# Patient Record
Sex: FEMALE | Race: WHITE | NOT HISPANIC OR LATINO
[De-identification: names, ages, dates, MRNs, and addresses within clinical notes are randomized per-mention and may not be internally consistent; named-entity substitution may affect disease eponyms.]

---

## 2017-03-24 ENCOUNTER — APPOINTMENT (OUTPATIENT)
Dept: CARDIOLOGY | Facility: CLINIC | Age: 77
End: 2017-03-24

## 2017-05-16 ENCOUNTER — APPOINTMENT (OUTPATIENT)
Dept: CARDIOLOGY | Facility: CLINIC | Age: 77
End: 2017-05-16

## 2017-05-25 ENCOUNTER — APPOINTMENT (OUTPATIENT)
Dept: CARDIOLOGY | Facility: CLINIC | Age: 77
End: 2017-05-25

## 2017-05-30 ENCOUNTER — APPOINTMENT (OUTPATIENT)
Dept: CARDIOLOGY | Facility: CLINIC | Age: 77
End: 2017-05-30

## 2017-05-31 ENCOUNTER — APPOINTMENT (OUTPATIENT)
Dept: CARDIOLOGY | Facility: CLINIC | Age: 77
End: 2017-05-31

## 2017-06-06 ENCOUNTER — APPOINTMENT (OUTPATIENT)
Dept: CARDIOLOGY | Facility: CLINIC | Age: 77
End: 2017-06-06

## 2017-06-09 ENCOUNTER — APPOINTMENT (OUTPATIENT)
Dept: CARDIOLOGY | Facility: CLINIC | Age: 77
End: 2017-06-09

## 2018-02-27 ENCOUNTER — RECORD ABSTRACTING (OUTPATIENT)
Age: 78
End: 2018-02-27

## 2018-02-27 DIAGNOSIS — Z87.39 PERSONAL HISTORY OF OTHER DISEASES OF THE MUSCULOSKELETAL SYSTEM AND CONNECTIVE TISSUE: ICD-10-CM

## 2018-02-27 DIAGNOSIS — Z80.9 FAMILY HISTORY OF MALIGNANT NEOPLASM, UNSPECIFIED: ICD-10-CM

## 2018-02-27 DIAGNOSIS — Z86.39 PERSONAL HISTORY OF OTHER ENDOCRINE, NUTRITIONAL AND METABOLIC DISEASE: ICD-10-CM

## 2018-02-27 DIAGNOSIS — Z86.79 PERSONAL HISTORY OF OTHER DISEASES OF THE CIRCULATORY SYSTEM: ICD-10-CM

## 2018-02-27 DIAGNOSIS — Z86.59 PERSONAL HISTORY OF OTHER MENTAL AND BEHAVIORAL DISORDERS: ICD-10-CM

## 2018-02-27 DIAGNOSIS — Z98.890 OTHER SPECIFIED POSTPROCEDURAL STATES: ICD-10-CM

## 2018-02-27 DIAGNOSIS — H54.40 BLINDNESS, ONE EYE, UNSPECIFIED EYE: ICD-10-CM

## 2018-02-27 DIAGNOSIS — K76.0 FATTY (CHANGE OF) LIVER, NOT ELSEWHERE CLASSIFIED: ICD-10-CM

## 2018-02-27 DIAGNOSIS — F17.200 NICOTINE DEPENDENCE, UNSPECIFIED, UNCOMPLICATED: ICD-10-CM

## 2018-02-28 ENCOUNTER — APPOINTMENT (OUTPATIENT)
Dept: CARDIOLOGY | Facility: CLINIC | Age: 78
End: 2018-02-28

## 2018-03-21 ENCOUNTER — APPOINTMENT (OUTPATIENT)
Dept: CARDIOLOGY | Facility: CLINIC | Age: 78
End: 2018-03-21
Payer: MEDICARE

## 2018-03-21 VITALS
HEART RATE: 90 BPM | SYSTOLIC BLOOD PRESSURE: 130 MMHG | BODY MASS INDEX: 37.3 KG/M2 | DIASTOLIC BLOOD PRESSURE: 60 MMHG | HEIGHT: 60 IN | WEIGHT: 190 LBS

## 2018-03-21 PROCEDURE — 93280 PM DEVICE PROGR EVAL DUAL: CPT

## 2018-05-29 ENCOUNTER — RESULT REVIEW (OUTPATIENT)
Age: 78
End: 2018-05-29

## 2018-06-08 ENCOUNTER — APPOINTMENT (OUTPATIENT)
Dept: CARDIOLOGY | Facility: CLINIC | Age: 78
End: 2018-06-08
Payer: MEDICARE

## 2018-06-08 VITALS
HEART RATE: 82 BPM | DIASTOLIC BLOOD PRESSURE: 68 MMHG | BODY MASS INDEX: 35.51 KG/M2 | WEIGHT: 193 LBS | SYSTOLIC BLOOD PRESSURE: 120 MMHG | HEIGHT: 62 IN

## 2018-06-08 PROCEDURE — 99214 OFFICE O/P EST MOD 30 MIN: CPT

## 2018-06-08 PROCEDURE — 93280 PM DEVICE PROGR EVAL DUAL: CPT

## 2018-07-09 ENCOUNTER — MEDICATION RENEWAL (OUTPATIENT)
Age: 78
End: 2018-07-09

## 2018-07-27 ENCOUNTER — APPOINTMENT (OUTPATIENT)
Dept: CARDIOLOGY | Facility: CLINIC | Age: 78
End: 2018-07-27

## 2018-09-14 ENCOUNTER — APPOINTMENT (OUTPATIENT)
Dept: CARDIOLOGY | Facility: CLINIC | Age: 78
End: 2018-09-14

## 2018-09-21 ENCOUNTER — APPOINTMENT (OUTPATIENT)
Dept: CARDIOLOGY | Facility: CLINIC | Age: 78
End: 2018-09-21

## 2018-10-19 ENCOUNTER — APPOINTMENT (OUTPATIENT)
Dept: CARDIOLOGY | Facility: CLINIC | Age: 78
End: 2018-10-19
Payer: MEDICARE

## 2018-10-19 VITALS
HEIGHT: 62 IN | HEART RATE: 70 BPM | BODY MASS INDEX: 35.15 KG/M2 | WEIGHT: 191 LBS | SYSTOLIC BLOOD PRESSURE: 136 MMHG | DIASTOLIC BLOOD PRESSURE: 76 MMHG

## 2018-10-19 PROCEDURE — 93000 ELECTROCARDIOGRAM COMPLETE: CPT | Mod: 59

## 2018-10-19 PROCEDURE — 99213 OFFICE O/P EST LOW 20 MIN: CPT

## 2018-10-19 PROCEDURE — 93280 PM DEVICE PROGR EVAL DUAL: CPT

## 2018-10-19 PROCEDURE — 93306 TTE W/DOPPLER COMPLETE: CPT

## 2018-10-24 ENCOUNTER — APPOINTMENT (OUTPATIENT)
Dept: CARDIOLOGY | Facility: CLINIC | Age: 78
End: 2018-10-24

## 2018-10-24 DIAGNOSIS — F10.21 ALCOHOL DEPENDENCE, IN REMISSION: ICD-10-CM

## 2018-10-24 DIAGNOSIS — Z82.49 FAMILY HISTORY OF ISCHEMIC HEART DISEASE AND OTHER DISEASES OF THE CIRCULATORY SYSTEM: ICD-10-CM

## 2018-11-02 ENCOUNTER — APPOINTMENT (OUTPATIENT)
Dept: CARDIOLOGY | Facility: CLINIC | Age: 78
End: 2018-11-02
Payer: MEDICARE

## 2018-11-02 PROCEDURE — A9502: CPT

## 2018-11-02 PROCEDURE — 93015 CV STRESS TEST SUPVJ I&R: CPT

## 2018-11-02 PROCEDURE — 78452 HT MUSCLE IMAGE SPECT MULT: CPT

## 2018-11-09 ENCOUNTER — APPOINTMENT (OUTPATIENT)
Dept: CARDIOLOGY | Facility: CLINIC | Age: 78
End: 2018-11-09

## 2018-12-20 ENCOUNTER — APPOINTMENT (OUTPATIENT)
Dept: CARDIOLOGY | Facility: CLINIC | Age: 78
End: 2018-12-20

## 2019-01-10 ENCOUNTER — APPOINTMENT (OUTPATIENT)
Dept: CARDIOLOGY | Facility: CLINIC | Age: 79
End: 2019-01-10

## 2019-04-04 ENCOUNTER — APPOINTMENT (OUTPATIENT)
Dept: CARDIOLOGY | Facility: CLINIC | Age: 79
End: 2019-04-04
Payer: MEDICARE

## 2019-04-04 VITALS
HEART RATE: 88 BPM | OXYGEN SATURATION: 98 % | SYSTOLIC BLOOD PRESSURE: 140 MMHG | DIASTOLIC BLOOD PRESSURE: 80 MMHG | BODY MASS INDEX: 33.13 KG/M2 | HEIGHT: 62 IN | WEIGHT: 180 LBS

## 2019-04-04 PROCEDURE — 99214 OFFICE O/P EST MOD 30 MIN: CPT

## 2019-04-17 ENCOUNTER — APPOINTMENT (OUTPATIENT)
Dept: CARDIOLOGY | Facility: CLINIC | Age: 79
End: 2019-04-17

## 2019-05-30 ENCOUNTER — CLINICAL ADVICE (OUTPATIENT)
Age: 79
End: 2019-05-30

## 2019-06-13 ENCOUNTER — APPOINTMENT (OUTPATIENT)
Dept: CARDIOLOGY | Facility: CLINIC | Age: 79
End: 2019-06-13

## 2019-06-20 ENCOUNTER — APPOINTMENT (OUTPATIENT)
Dept: CARDIOLOGY | Facility: CLINIC | Age: 79
End: 2019-06-20

## 2019-08-23 ENCOUNTER — APPOINTMENT (OUTPATIENT)
Dept: CARDIOLOGY | Facility: CLINIC | Age: 79
End: 2019-08-23

## 2019-08-27 ENCOUNTER — APPOINTMENT (OUTPATIENT)
Dept: CARDIOLOGY | Facility: CLINIC | Age: 79
End: 2019-08-27
Payer: MEDICARE

## 2019-08-27 PROCEDURE — 93280 PM DEVICE PROGR EVAL DUAL: CPT

## 2019-08-27 NOTE — PROCEDURE
[NSR] : normal sinus rhythm [Pacemaker] : pacemaker [Voltage: ___ volts] : Voltage was [unfilled] volts [DDDR] : DDDR [Sensing Amplitude ___mv] : sensing amplitude was [unfilled] mv [Lead Imp:  ___ohms] : lead impedance was [unfilled] ohms [___V @] : [unfilled] V [___ ms] : [unfilled] ms [None] : none [de-identified] : Medtronic [de-identified] : Adaptkamilah ADDRS1 [de-identified] : [id number] [de-identified] : 12/15/11 [de-identified] :  [de-identified] : Apaced 43.7%\par Vpaced 0.6%\par \par Settings \par Atrial \par Sensing 0.5mv\par Amplitude 1.5v\par Duration 0.4ms\par \par Ventricle\par Sensing 5.6mv\par Amplitude 2.0v\par Duration 0.4ms\par \par PPM interrogation in 2 months\par Patient requesting office visit with Dr. Roman\par \par Sincerely,\par \par Vani Crum PA-C\par Supervising MD: Dr. Nichole Almeida  [de-identified] : 1-12months

## 2019-08-27 NOTE — HISTORY OF PRESENT ILLNESS
[de-identified] : Patient called the office requesting to be seen for left arm swelling.  She is concerned because she has a permanent pacemaker on the left side which was implanted in 2011.  Recently called the office with similar concerns.  Recently evaluated at [facility] on February 27, 2018 for left upper extremity swelling and arm pain.  Ultrasound of the left upper extremity was performed to rule out DVT.  No evidence of left upper extremity DVT. She ended up leaving the hospital AMA. PPM has not been interrogated in our office recently. She states symptoms of left upper extremity swelling has been going on "for months". There is a history of a slip and fall onto her left arm months ago as well. Today she denies pain to her left arm\par \par Today he denies chest pain, pressure, unusual shortness of breath, lightheadedness, dizziness, near syncope or syncope.\par

## 2019-09-18 ENCOUNTER — APPOINTMENT (OUTPATIENT)
Dept: CARDIOLOGY | Facility: CLINIC | Age: 79
End: 2019-09-18

## 2019-09-18 ENCOUNTER — APPOINTMENT (OUTPATIENT)
Dept: CARDIOLOGY | Facility: CLINIC | Age: 79
End: 2019-09-18
Payer: MEDICARE

## 2019-09-18 PROCEDURE — 93280 PM DEVICE PROGR EVAL DUAL: CPT

## 2019-09-18 RX ORDER — KRILL/OM-3/DHA/EPA/PHOSPHO/AST 1000-230MG
81 CAPSULE ORAL
Refills: 0 | Status: ACTIVE | COMMUNITY

## 2019-09-18 NOTE — HISTORY OF PRESENT ILLNESS
[de-identified] : Patient called the office requesting to be seen for left arm swelling.  She is concerned because she has a permanent pacemaker on the left side which was implanted in 2011.  Recently called the office with similar concerns.  Recently evaluated at [facility] on February 27, 2018 for left upper extremity swelling and arm pain.  Ultrasound of the left upper extremity was performed to rule out DVT.  No evidence of left upper extremity DVT. She ended up leaving the hospital AMA. PPM has not been interrogated in our office recently. She states symptoms of left upper extremity swelling has been going on "for months". There is a history of a slip and fall onto her left arm months ago as well. Today she denies pain to her left arm\par \par Today he denies chest pain, pressure, unusual shortness of breath, lightheadedness, dizziness, near syncope or syncope.\par

## 2019-09-18 NOTE — PROCEDURE
[NSR] : normal sinus rhythm [Pacemaker] : pacemaker [Voltage: ___ volts] : Voltage was [unfilled] volts [DDDR] : DDDR [Lead Imp:  ___ohms] : lead impedance was [unfilled] ohms [Sensing Amplitude ___mv] : sensing amplitude was [unfilled] mv [___ ms] : [unfilled] ms [___V @] : [unfilled] V [None] : none [de-identified] : Adaptkamilah ADDRS1 [de-identified] : Medtronic [de-identified] : [id number] [de-identified] : 12/15/11 [de-identified] : 1-13months [de-identified] : Apaced 10.3%\par Vpaced 0.4%\par \par Settings \par Atrial \par Sensing 0.5mv\par Amplitude 1.5v\par Duration 0.4ms\par \par Ventricle\par Sensing 5.6mv\par Amplitude 2.0v\par Duration 0.4ms\par \par \par Patient called the office requesting to have PPM interrogated. \par No new symptoms at todays visit. \par \par PPM interrogation in 2 months\par \par \par Sincerely,\par \par Vani Crum PA-C\par Supervising MD: Dr. Tavera [de-identified] :

## 2019-09-26 ENCOUNTER — APPOINTMENT (OUTPATIENT)
Dept: CARDIOLOGY | Facility: CLINIC | Age: 79
End: 2019-09-26

## 2019-11-04 ENCOUNTER — APPOINTMENT (OUTPATIENT)
Dept: CARDIOLOGY | Facility: CLINIC | Age: 79
End: 2019-11-04
Payer: MEDICARE

## 2019-11-04 ENCOUNTER — NON-APPOINTMENT (OUTPATIENT)
Age: 79
End: 2019-11-04

## 2019-11-04 VITALS
BODY MASS INDEX: 29.44 KG/M2 | WEIGHT: 160 LBS | HEART RATE: 84 BPM | SYSTOLIC BLOOD PRESSURE: 142 MMHG | HEIGHT: 62 IN | DIASTOLIC BLOOD PRESSURE: 70 MMHG

## 2019-11-04 PROCEDURE — 93000 ELECTROCARDIOGRAM COMPLETE: CPT | Mod: 59

## 2019-11-04 PROCEDURE — 93280 PM DEVICE PROGR EVAL DUAL: CPT

## 2019-11-04 PROCEDURE — 99214 OFFICE O/P EST MOD 30 MIN: CPT | Mod: 25

## 2019-11-04 NOTE — PHYSICAL EXAM
[General Appearance - Well Developed] : well developed [Normal Appearance] : normal appearance [Well Groomed] : well groomed [General Appearance - Well Nourished] : well nourished [No Deformities] : no deformities [General Appearance - In No Acute Distress] : no acute distress [Normal Conjunctiva] : the conjunctiva exhibited no abnormalities [Eyelids - No Xanthelasma] : the eyelids demonstrated no xanthelasmas [Normal Oral Mucosa] : normal oral mucosa [No Oral Pallor] : no oral pallor [No Oral Cyanosis] : no oral cyanosis [Respiration, Rhythm And Depth] : normal respiratory rhythm and effort [Exaggerated Use Of Accessory Muscles For Inspiration] : no accessory muscle use [Auscultation Breath Sounds / Voice Sounds] : lungs were clear to auscultation bilaterally [Heart Rate And Rhythm] : heart rate and rhythm were normal [Heart Sounds] : normal S1 and S2 [Murmurs] : no murmurs present [Abdomen Mass (___ Cm)] : no abdominal mass palpated [Nail Clubbing] : no clubbing of the fingernails [Cyanosis, Localized] : no localized cyanosis [Petechial Hemorrhages (___cm)] : no petechial hemorrhages [Skin Color & Pigmentation] : normal skin color and pigmentation [Skin Turgor] : normal skin turgor [] : no rash [No Anxiety] : not feeling anxious [FreeTextEntry1] : flat affect, forgetful.

## 2019-11-04 NOTE — REVIEW OF SYSTEMS
[Feeling Fatigued] : feeling fatigued [Shortness Of Breath] : shortness of breath [Dyspnea on exertion] : dyspnea during exertion [Chest Pain] : chest pain [Lower Ext Edema] : no extremity edema [Joint Pain] : joint pain [see HPI] : see HPI [Memory Lapses Or Loss] : memory lapses or loss [Negative] : Heme/Lymph

## 2019-11-04 NOTE — REASON FOR VISIT
[Follow-Up - Clinic] : a clinic follow-up of [Chest Pain] : chest pain [Hypertension] : hypertension [Medication Management] : Medication management [Pacemaker Evaluation] : pacemaker ~T evaluation ~C was performed

## 2019-11-04 NOTE — PROCEDURE
[NSR] : normal sinus rhythm [Pacemaker] : pacemaker [DDDR] : DDDR [Voltage: ___ volts] : Voltage was [unfilled] volts [Lead Imp:  ___ohms] : lead impedance was [unfilled] ohms [Sensing Amplitude ___mv] : sensing amplitude was [unfilled] mv [___V @] : [unfilled] V [___ ms] : [unfilled] ms [None] : none [de-identified] : Medtronic [de-identified] : Adaptkamilah ADDRS1 [de-identified] : [id number] [de-identified] : 12/15/11 [de-identified] :  [de-identified] : 1-9months [de-identified] : Apaced 22.2%\par Vpaced 1%\par \par Settings \par Atrial \par Sensing 0.5mv\par Amplitude 1.5v\par Duration 0.4ms\par \par Ventricle\par Sensing 5.6mv\par Amplitude 2.0v\par Duration 0.4ms\par \par PPM interrogation in 2 months\par \par \par Sincerely,\par \par Vani Crum PA-C\par Supervising MD: Dr. Roman

## 2019-11-04 NOTE — HISTORY OF PRESENT ILLNESS
[de-identified] : Patient called the office requesting to be seen for left arm swelling.  She is concerned because she has a permanent pacemaker on the left side which was implanted in 2011.  Recently called the office with similar concerns.  Recently evaluated at [facility] on February 27, 2018 for left upper extremity swelling and arm pain.  Ultrasound of the left upper extremity was performed to rule out DVT.  No evidence of left upper extremity DVT. She ended up leaving the hospital AMA. PPM has not been interrogated in our office recently. She states symptoms of left upper extremity swelling has been going on "for months". There is a history of a slip and fall onto her left arm months ago as well. Today she denies pain to her left arm\par \par Today he denies chest pain, pressure, unusual shortness of breath, lightheadedness, dizziness, near syncope or syncope.\par

## 2019-11-04 NOTE — DISCUSSION/SUMMARY
[FreeTextEntry1] : 79F w/ PMH as above presenting for routine evaluation.  Her stress test showed no signs of ischemia.  She continues to smoke and I have counselled her extensively on cessation. \par \par 1. Continue beta blockade - BP adequately controlled\par 3. Strict DM control, smoking cessation\par 3. Psych follow up\par 4. Device follow up\par \par RTC 6 months

## 2019-11-04 NOTE — HISTORY OF PRESENT ILLNESS
[FreeTextEntry1] : 79F w/ PMH of HTN, DM, HLD, active smoker, PPM presenting for follow up in cardiology. Patient has poor exercise tolerance and is only able to walk 1 block at most without stopping.  She lives in a ranch style home and is unable to climb stairs. 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 She still smokes 1ppd. \par \par She had a fall over the summer. She states she was evaluated at [facility]. No fractures. Continues to be fatigued and with memory difficulty. \par

## 2020-03-11 ENCOUNTER — APPOINTMENT (OUTPATIENT)
Dept: CARDIOLOGY | Facility: CLINIC | Age: 80
End: 2020-03-11
Payer: MEDICARE

## 2020-03-11 VITALS
WEIGHT: 160 LBS | HEART RATE: 86 BPM | DIASTOLIC BLOOD PRESSURE: 78 MMHG | BODY MASS INDEX: 31.41 KG/M2 | HEIGHT: 60 IN | SYSTOLIC BLOOD PRESSURE: 132 MMHG | OXYGEN SATURATION: 100 %

## 2020-03-11 PROCEDURE — 93279 PRGRMG DEV EVAL PM/LDLS PM: CPT

## 2020-03-11 PROCEDURE — 99215 OFFICE O/P EST HI 40 MIN: CPT

## 2020-03-11 PROCEDURE — 93280 PM DEVICE PROGR EVAL DUAL: CPT

## 2020-03-11 PROCEDURE — 93306 TTE W/DOPPLER COMPLETE: CPT

## 2020-03-11 NOTE — REVIEW OF SYSTEMS
[Feeling Fatigued] : feeling fatigued [Shortness Of Breath] : shortness of breath [Dyspnea on exertion] : dyspnea during exertion [Chest Pain] : chest pain [Joint Pain] : joint pain [see HPI] : see HPI [Memory Lapses Or Loss] : memory lapses or loss [Negative] : Heme/Lymph [Lower Ext Edema] : no extremity edema

## 2020-03-11 NOTE — DISCUSSION/SUMMARY
[FreeTextEntry1] : 79F w/ PMH as above presenting for routine evaluation.  Her stress test showed no signs of ischemia.  She continues to smoke and I have counselled her extensively on cessation. \par \par 1. Hypertension: Continue beta blockade (high risk medication with no signs of toxicity) - BP adequately controlled\par 3. Strict DM control, smoking cessation\par 3. Psych follow up\par 4. History of Bradycardia: PPM approaching DARRON. Update echo and EP evaluation for generator change.\par \par RTC 6 months

## 2020-03-11 NOTE — PROCEDURE
[NSR] : normal sinus rhythm [Pacemaker] : pacemaker [VVI] : VVI [Voltage: ___ volts] : Voltage was [unfilled] volts [None] : none [de-identified] : Medtronic [de-identified] : Adaptkamilah ADDRS1 [de-identified] : [id number] [de-identified] : 12/15/11 [de-identified] :  [de-identified] : 67 [de-identified] : DARRON triggered on 2/19/2020 [de-identified] : Sensed: 99.1%\par Vpaced 0.9%\par \par Settings \par Atrial \par Sensing 0.5mv\par Amplitude 1.5v\par Duration 0.4ms\par \par Ventricle\par Sensing 5.6mv\par Amplitude 2.0v\par Duration 0.4ms\par \par Device at DARRON. \par Seeing Dr. Roman and Tamie Puckett today.

## 2020-03-11 NOTE — HISTORY OF PRESENT ILLNESS
[FreeTextEntry1] : 79F w/ PMH of HTN, DM, HLD, active smoker, PPM presenting for follow up in cardiology. Patient has poor exercise tolerance and is only able to walk 1 block at most without stopping.  She lives in a ranch style home and is unable to climb stairs. 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 She still smokes 1ppd. \par \par She had a fall over the summer. She states she was evaluated at [facility]. No fractures. Continues to be fatigued and with memory difficulty. \par \par Still complaining of left arm swelling.\par

## 2020-04-06 ENCOUNTER — OUTPATIENT (OUTPATIENT)
Dept: OUTPATIENT SERVICES | Facility: HOSPITAL | Age: 80
LOS: 1 days | End: 2020-04-06
Payer: MEDICARE

## 2020-04-06 DIAGNOSIS — Z90.49 ACQUIRED ABSENCE OF OTHER SPECIFIED PARTS OF DIGESTIVE TRACT: Chronic | ICD-10-CM

## 2020-04-06 DIAGNOSIS — E89.0 POSTPROCEDURAL HYPOTHYROIDISM: Chronic | ICD-10-CM

## 2020-04-06 DIAGNOSIS — Z95.0 PRESENCE OF CARDIAC PACEMAKER: Chronic | ICD-10-CM

## 2020-04-06 DIAGNOSIS — Z98.51 TUBAL LIGATION STATUS: Chronic | ICD-10-CM

## 2020-04-06 PROCEDURE — 33228 REMV&REPLC PM GEN DUAL LEAD: CPT

## 2020-04-06 PROCEDURE — 99235 HOSP IP/OBS SAME DATE MOD 70: CPT | Mod: 25

## 2020-04-13 ENCOUNTER — APPOINTMENT (OUTPATIENT)
Dept: CARDIOLOGY | Facility: CLINIC | Age: 80
End: 2020-04-13
Payer: MEDICARE

## 2020-04-13 ENCOUNTER — APPOINTMENT (OUTPATIENT)
Dept: ELECTROPHYSIOLOGY | Facility: CLINIC | Age: 80
End: 2020-04-13
Payer: MEDICARE

## 2020-04-13 VITALS — SYSTOLIC BLOOD PRESSURE: 120 MMHG | OXYGEN SATURATION: 98 % | HEART RATE: 62 BPM | DIASTOLIC BLOOD PRESSURE: 60 MMHG

## 2020-04-13 DIAGNOSIS — Z45.010 ENCOUNTER FOR CHECKING AND TESTING OF CARDIAC PACEMAKER PULSE GENERATOR [BATTERY]: ICD-10-CM

## 2020-04-13 PROCEDURE — 93280 PM DEVICE PROGR EVAL DUAL: CPT

## 2020-04-13 PROCEDURE — 99024 POSTOP FOLLOW-UP VISIT: CPT

## 2020-04-13 RX ORDER — QUETIAPINE 100 MG/1
100 TABLET, FILM COATED ORAL DAILY
Refills: 0 | Status: ACTIVE | COMMUNITY

## 2020-04-13 RX ORDER — MECLIZINE HYDROCHLORIDE 25 MG/1
25 TABLET ORAL
Refills: 0 | Status: DISCONTINUED | COMMUNITY
End: 2020-04-13

## 2020-04-13 RX ORDER — LEVOTHYROXINE SODIUM 0.11 MG/1
112 TABLET ORAL DAILY
Refills: 0 | Status: ACTIVE | COMMUNITY

## 2020-04-13 RX ORDER — INSULIN DETEMIR 100 [IU]/ML
100 INJECTION, SOLUTION SUBCUTANEOUS
Refills: 0 | Status: DISCONTINUED | COMMUNITY
End: 2020-04-13

## 2020-04-13 RX ORDER — DULOXETINE HYDROCHLORIDE 30 MG/1
30 CAPSULE, DELAYED RELEASE PELLETS ORAL
Refills: 0 | Status: ACTIVE | COMMUNITY

## 2020-04-13 RX ORDER — DULOXETINE HYDROCHLORIDE 60 MG/1
60 CAPSULE, DELAYED RELEASE PELLETS ORAL
Refills: 0 | Status: ACTIVE | COMMUNITY

## 2020-04-13 RX ORDER — QUETIAPINE 300 MG/1
300 TABLET, FILM COATED ORAL DAILY
Refills: 0 | Status: DISCONTINUED | COMMUNITY
End: 2020-04-13

## 2020-04-13 RX ORDER — INSULIN GLARGINE 100 [IU]/ML
100 INJECTION, SOLUTION SUBCUTANEOUS
Refills: 0 | Status: DISCONTINUED | COMMUNITY
End: 2020-04-13

## 2020-04-14 ENCOUNTER — APPOINTMENT (OUTPATIENT)
Dept: CARDIOLOGY | Facility: CLINIC | Age: 80
End: 2020-04-14

## 2020-04-30 ENCOUNTER — NON-APPOINTMENT (OUTPATIENT)
Age: 80
End: 2020-04-30

## 2020-12-10 ENCOUNTER — NON-APPOINTMENT (OUTPATIENT)
Age: 80
End: 2020-12-10

## 2020-12-11 ENCOUNTER — EMERGENCY (EMERGENCY)
Facility: HOSPITAL | Age: 80
LOS: 1 days | End: 2020-12-11
Admitting: EMERGENCY MEDICINE
Payer: MEDICARE

## 2020-12-11 ENCOUNTER — APPOINTMENT (OUTPATIENT)
Dept: CARDIOLOGY | Facility: CLINIC | Age: 80
End: 2020-12-11
Payer: MEDICARE

## 2020-12-11 ENCOUNTER — NON-APPOINTMENT (OUTPATIENT)
Age: 80
End: 2020-12-11

## 2020-12-11 VITALS
HEART RATE: 60 BPM | DIASTOLIC BLOOD PRESSURE: 68 MMHG | SYSTOLIC BLOOD PRESSURE: 114 MMHG | OXYGEN SATURATION: 95 % | WEIGHT: 178 LBS | HEIGHT: 60 IN | BODY MASS INDEX: 34.95 KG/M2

## 2020-12-11 DIAGNOSIS — Z90.49 ACQUIRED ABSENCE OF OTHER SPECIFIED PARTS OF DIGESTIVE TRACT: Chronic | ICD-10-CM

## 2020-12-11 DIAGNOSIS — E89.0 POSTPROCEDURAL HYPOTHYROIDISM: Chronic | ICD-10-CM

## 2020-12-11 DIAGNOSIS — Z95.0 PRESENCE OF CARDIAC PACEMAKER: Chronic | ICD-10-CM

## 2020-12-11 DIAGNOSIS — Z98.51 TUBAL LIGATION STATUS: Chronic | ICD-10-CM

## 2020-12-11 PROCEDURE — 99214 OFFICE O/P EST MOD 30 MIN: CPT

## 2020-12-11 PROCEDURE — 93010 ELECTROCARDIOGRAM REPORT: CPT

## 2020-12-11 PROCEDURE — 99285 EMERGENCY DEPT VISIT HI MDM: CPT | Mod: GC

## 2020-12-11 PROCEDURE — 93000 ELECTROCARDIOGRAM COMPLETE: CPT

## 2020-12-11 NOTE — PHYSICAL EXAM
[General Appearance - Well Developed] : well developed [Normal Appearance] : normal appearance [Well Groomed] : well groomed [General Appearance - Well Nourished] : well nourished [No Deformities] : no deformities [General Appearance - In No Acute Distress] : no acute distress [Normal Conjunctiva] : the conjunctiva exhibited no abnormalities [Eyelids - No Xanthelasma] : the eyelids demonstrated no xanthelasmas [Normal Oral Mucosa] : normal oral mucosa [No Oral Pallor] : no oral pallor [No Oral Cyanosis] : no oral cyanosis [Normal Jugular Venous A Waves Present] : normal jugular venous A waves present [Normal Jugular Venous V Waves Present] : normal jugular venous V waves present [No Jugular Venous Wade A Waves] : no jugular venous wade A waves [] : no respiratory distress [Respiration, Rhythm And Depth] : normal respiratory rhythm and effort [Exaggerated Use Of Accessory Muscles For Inspiration] : no accessory muscle use [Auscultation Breath Sounds / Voice Sounds] : lungs were clear to auscultation bilaterally [Heart Rate And Rhythm] : heart rate and rhythm were normal [Heart Sounds] : normal S1 and S2 [Murmurs] : no murmurs present

## 2020-12-11 NOTE — ASSESSMENT
[FreeTextEntry1] : Left arm swelling: The patient is status post permanent pacemaker implant on the left side.  The patient recently noted noted left upper extremity edema.  There is no edema today.  She states it is somewhat intermittent.\par \par Shortness of breath: Today the patient noted exertional shortness of breath after moderate exertion.  This is unusual for her.\par \par Overall I have recommended emergency room evaluation for upper extremity duplex as well as CTA of the pulmonary vasculature.  Reviewed with Dr. Mayo in the ER.\par \par Hypertension: Well-controlled.\par \par Bradycardia: Patient is status post permanent pacemaker implant.

## 2020-12-13 ENCOUNTER — EMERGENCY (EMERGENCY)
Facility: HOSPITAL | Age: 80
LOS: 1 days | End: 2020-12-13
Admitting: EMERGENCY MEDICINE
Payer: MEDICARE

## 2020-12-13 ENCOUNTER — NON-APPOINTMENT (OUTPATIENT)
Age: 80
End: 2020-12-13

## 2020-12-13 DIAGNOSIS — Z95.0 PRESENCE OF CARDIAC PACEMAKER: Chronic | ICD-10-CM

## 2020-12-13 DIAGNOSIS — Z90.49 ACQUIRED ABSENCE OF OTHER SPECIFIED PARTS OF DIGESTIVE TRACT: Chronic | ICD-10-CM

## 2020-12-13 DIAGNOSIS — E89.0 POSTPROCEDURAL HYPOTHYROIDISM: Chronic | ICD-10-CM

## 2020-12-13 DIAGNOSIS — Z98.51 TUBAL LIGATION STATUS: Chronic | ICD-10-CM

## 2020-12-13 PROCEDURE — 93010 ELECTROCARDIOGRAM REPORT: CPT

## 2020-12-13 PROCEDURE — 93971 EXTREMITY STUDY: CPT | Mod: 26,LT

## 2020-12-13 PROCEDURE — 99284 EMERGENCY DEPT VISIT MOD MDM: CPT

## 2020-12-13 PROCEDURE — 71045 X-RAY EXAM CHEST 1 VIEW: CPT | Mod: 26

## 2020-12-14 ENCOUNTER — NON-APPOINTMENT (OUTPATIENT)
Age: 80
End: 2020-12-14

## 2021-02-15 ENCOUNTER — NON-APPOINTMENT (OUTPATIENT)
Age: 81
End: 2021-02-15

## 2021-02-15 ENCOUNTER — RESULT CHARGE (OUTPATIENT)
Age: 81
End: 2021-02-15

## 2021-02-16 ENCOUNTER — APPOINTMENT (OUTPATIENT)
Dept: CARDIOLOGY | Facility: CLINIC | Age: 81
End: 2021-02-16
Payer: MEDICARE

## 2021-02-16 VITALS
HEART RATE: 86 BPM | WEIGHT: 172 LBS | HEIGHT: 60 IN | OXYGEN SATURATION: 99 % | DIASTOLIC BLOOD PRESSURE: 80 MMHG | SYSTOLIC BLOOD PRESSURE: 160 MMHG | BODY MASS INDEX: 33.77 KG/M2

## 2021-02-16 PROCEDURE — 93280 PM DEVICE PROGR EVAL DUAL: CPT

## 2021-02-16 PROCEDURE — 99215 OFFICE O/P EST HI 40 MIN: CPT | Mod: 25

## 2021-02-16 RX ORDER — FUROSEMIDE 20 MG/1
20 TABLET ORAL
Qty: 30 | Refills: 0 | Status: DISCONTINUED | COMMUNITY
End: 2021-02-16

## 2021-02-16 NOTE — HISTORY OF PRESENT ILLNESS
[FreeTextEntry1] : 80F w/ PMH of HTN, DM, HLD, active smoker, PPM s/p gen change April 2020, frequent falls. She has very unstable gait at baseline. She has limited functional status. \par \par She was in [facility] Dec 2020 for LUE swelling. US study negative for DVT. CXR showed NAD. She was discharged in stable condition. \par \par More recently was taken to [facility] (awaiting records), according to patient was 2/9/21 after a fall face forward when getting up from chair at the hair salon. Pt denies any prodromal symptoms. She states at home has had several falls. She may be noncompliant with use of cane/walker. She states was sent home without any change in medications, advised to f/u ENT and cardiology. Periorbital areas appear bruised. She is very unsteady. She seems to have poor hydration as well. This morning only had a "mocha" and she is orthostatic. BP seated 140/70 and standing 110/60, denies dizziness. \par \par Her device interrogation had no events from July until 2/4 and 2/10, 2021. There were 3 episodes of SVT 1-6 mins, 150-180s. This did not correlate with falls/syncope. She is on beta blocker. \par \par LVEF March 2020 was normal. Nuclear stress test Nov 2018 no significant ischemia. Awaiting [place] labs and [place] [facility] \par \par She denies chest pain, unusual sob, orthopnea, edema, palpitations. Denies abnormal bleeding/black stool.

## 2021-02-16 NOTE — PROCEDURE
[No] : not [See Scanned Paceart Report] : See scanned paceart report [Pacemaker] : pacemaker [de-identified] : RORY

## 2021-02-16 NOTE — REASON FOR VISIT
[Follow-Up - From Hospitalization] : follow-up of a recent hospitalization for [Syncope] : syncope [Discharge Date: ___] : Discharge Date: [unfilled] [Friend] : friend

## 2021-02-16 NOTE — ADDENDUM
[FreeTextEntry1] : Rec'd CT scans date was actually 1/29/21 at [facility], no acute fracture of c-spine or facial bones. No acute intracranial abnormality.

## 2021-02-16 NOTE — REVIEW OF SYSTEMS
[Joint Stiffness] : joint stiffness [Joint Pain] : joint pain [Dizziness] : dizziness [see HPI] : see HPI [Memory Lapses Or Loss] : memory lapses or loss [Negative] : Heme/Lymph

## 2021-02-16 NOTE — PHYSICAL EXAM
[General Appearance - Well Developed] : well developed [Normal Appearance] : normal appearance [Well Groomed] : well groomed [General Appearance - Well Nourished] : well nourished [No Deformities] : no deformities [General Appearance - In No Acute Distress] : no acute distress [Normal Conjunctiva] : the conjunctiva exhibited no abnormalities [Eyelids - No Xanthelasma] : the eyelids demonstrated no xanthelasmas [No Oral Cyanosis] : no oral cyanosis [No Oral Pallor] : no oral pallor [Respiration, Rhythm And Depth] : normal respiratory rhythm and effort [Exaggerated Use Of Accessory Muscles For Inspiration] : no accessory muscle use [Auscultation Breath Sounds / Voice Sounds] : lungs were clear to auscultation bilaterally [Heart Rate And Rhythm] : heart rate and rhythm were normal [Heart Sounds] : normal S1 and S2 [Murmurs] : no murmurs present [Edema] : no peripheral edema present [Abdomen Soft] : soft [Abdomen Tenderness] : non-tender [Abdomen Mass (___ Cm)] : no abdominal mass palpated [Abnormal Walk] : normal gait [Gait - Sufficient For Exercise Testing] : the gait was sufficient for exercise testing [Nail Clubbing] : no clubbing of the fingernails [Cyanosis, Localized] : no localized cyanosis [Petechial Hemorrhages (___cm)] : no petechial hemorrhages [] : no ischemic changes [Skin Color & Pigmentation] : normal skin color and pigmentation [Mood] : the mood was normal [FreeTextEntry1] : forgetful

## 2021-02-16 NOTE — DISCUSSION/SUMMARY
[FreeTextEntry1] : ASAF JOHN is a 80 year old F who presents today Feb 16, 2021 in hospital followup after a syncopal episode on 2/9/21, awaiting records. \par \par 1. Syncope: Pt has significant orthostatic hypotension on exam today. Seems to have poor hydration. She is also on lasix with no history of CHF, fluid volume status stable on exam. There is no edema. Recommend to stop diuretic and increase PO hydration with electrolytes. Avoid excess caffeine. Use caution with position change such as from sit to stand. Use walker at ALL times when ambulating!!!! Fall precautions provided. Will addend note once [facility] records have been rec'd if needed. \par - Check carotid US to r/o obstructive disease. \par \par 2. SVT: No clear correlation with falls/above symptoms. No recent episode >6mins. Pt on beta blocker. Check echo to monitor LVEF. Will review labs for electrolyte status. Unable to uptitrate BB d/t orthostasis at this time. Will reassess in office in 2 weeks to see if possible. \par \par 3. PPM: stable function today, normal battery and lead measurements. \par \par 4. Claudication: Cont baby ASA daily. Walking ability is limited. Chronic smoking is noted. Check abd US to r/o aneurysm/obstruction. \par \par F/U PCP to assess if home assistance is needed. \par Close clinical followup. Reviewed followup details with pt's friend Nemesio who helps care for her. \par Reviewed red flag symptoms which would warrant emergent medical evaluation. \par Any questions and concerns were addressed and resolved. \par \par Sincerely,\par \par JOSE Baker\par Patients history, testing, and plan reviewed with supervising MD: Dr. Patrick Valentino

## 2021-02-26 ENCOUNTER — NON-APPOINTMENT (OUTPATIENT)
Age: 81
End: 2021-02-26

## 2021-03-11 ENCOUNTER — APPOINTMENT (OUTPATIENT)
Dept: CARDIOLOGY | Facility: CLINIC | Age: 81
End: 2021-03-11
Payer: MEDICARE

## 2021-03-11 VITALS
TEMPERATURE: 97.7 F | HEIGHT: 60 IN | DIASTOLIC BLOOD PRESSURE: 70 MMHG | OXYGEN SATURATION: 99 % | WEIGHT: 172 LBS | HEART RATE: 78 BPM | BODY MASS INDEX: 33.77 KG/M2 | SYSTOLIC BLOOD PRESSURE: 138 MMHG

## 2021-03-11 PROCEDURE — 99072 ADDL SUPL MATRL&STAF TM PHE: CPT

## 2021-03-11 PROCEDURE — 93280 PM DEVICE PROGR EVAL DUAL: CPT

## 2021-03-11 RX ORDER — INSULIN DEGLUDEC INJECTION 100 U/ML
100 INJECTION, SOLUTION SUBCUTANEOUS
Qty: 1 | Refills: 0 | Status: ACTIVE | COMMUNITY
Start: 2021-03-11

## 2021-03-11 RX ORDER — LITHIUM CARBONATE 300 MG/1
300 CAPSULE ORAL EVERY OTHER DAY
Refills: 0 | Status: ACTIVE | COMMUNITY

## 2021-03-22 ENCOUNTER — APPOINTMENT (OUTPATIENT)
Dept: CARDIOLOGY | Facility: CLINIC | Age: 81
End: 2021-03-22
Payer: MEDICARE

## 2021-03-22 PROCEDURE — 99072 ADDL SUPL MATRL&STAF TM PHE: CPT

## 2021-03-22 PROCEDURE — 93306 TTE W/DOPPLER COMPLETE: CPT

## 2021-03-22 PROCEDURE — 93880 EXTRACRANIAL BILAT STUDY: CPT

## 2021-03-22 PROCEDURE — 93979 VASCULAR STUDY: CPT

## 2021-03-25 ENCOUNTER — NON-APPOINTMENT (OUTPATIENT)
Age: 81
End: 2021-03-25

## 2021-03-26 ENCOUNTER — APPOINTMENT (OUTPATIENT)
Dept: CARDIOLOGY | Facility: CLINIC | Age: 81
End: 2021-03-26

## 2021-04-10 ENCOUNTER — TRANSCRIPTION ENCOUNTER (OUTPATIENT)
Age: 81
End: 2021-04-10

## 2021-04-28 ENCOUNTER — APPOINTMENT (OUTPATIENT)
Dept: ELECTROPHYSIOLOGY | Facility: CLINIC | Age: 81
End: 2021-04-28

## 2021-07-01 ENCOUNTER — RESULT CHARGE (OUTPATIENT)
Age: 81
End: 2021-07-01

## 2021-07-02 ENCOUNTER — APPOINTMENT (OUTPATIENT)
Dept: CARDIOLOGY | Facility: CLINIC | Age: 81
End: 2021-07-02
Payer: MEDICARE

## 2021-07-02 VITALS
OXYGEN SATURATION: 98 % | DIASTOLIC BLOOD PRESSURE: 60 MMHG | BODY MASS INDEX: 32.39 KG/M2 | HEIGHT: 60 IN | WEIGHT: 165 LBS | HEART RATE: 67 BPM | TEMPERATURE: 96.8 F | SYSTOLIC BLOOD PRESSURE: 104 MMHG

## 2021-07-02 PROCEDURE — 93280 PM DEVICE PROGR EVAL DUAL: CPT

## 2021-07-02 RX ORDER — PIOGLITAZONE HYDROCHLORIDE 15 MG/1
15 TABLET ORAL DAILY
Refills: 0 | Status: DISCONTINUED | COMMUNITY
End: 2021-07-02

## 2021-07-02 RX ORDER — LINAGLIPTIN 5 MG/1
5 TABLET, FILM COATED ORAL DAILY
Refills: 0 | Status: DISCONTINUED | COMMUNITY
End: 2021-07-02

## 2021-07-02 RX ORDER — ACETAMINOPHEN 500 MG/1
500 TABLET, COATED ORAL
Refills: 0 | Status: DISCONTINUED | COMMUNITY
End: 2021-07-02

## 2021-07-02 NOTE — PROCEDURE
[Pacemaker] : pacemaker [Lead Imp:  ___ohms] : lead impedance was [unfilled] ohms [Sensing Amplitude ___mv] : sensing amplitude was [unfilled] mv [___V @] : [unfilled] V [___ ms] : [unfilled] ms [de-identified] : Medtronic [de-identified] : Adaptkamilah ADDRS1 [de-identified] : [id number] [de-identified] : 4/6/2020 [de-identified] : AAIR<=>DDDR [de-identified] :  [de-identified] : Battery longevity 13.5 years. [de-identified] : AP 48.5%\par  <0.1%\par \par Settings:\par RA: Amplitude 1.5V (auto), pulse width 0.4ms (auto), sensitivity 0.3mV\par RV: Amplitude 2V (Auto), pulse width 0.4ms (auto), sensitivity 1.2mV\par \par 7 episodes of AT. 2 labeled NSVT, but appear to be 1:1 conduction.Longest 7 and a half minutes in duration. Rates 188 bpm. On betablocker. Patient is unsure if sx's correlate with any specific times. Since last seen she has had multiple falls (known history). 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 Estrada. I asked her when the fall was and she and her friend responded "either March, April, May, or June" indicating poor historians.\par \par Denies CP, SOB. Reports palpitations and feeling imbalanced.\par \par On Metoprolol.\par \par Prior hx of orthopstatic hypotension.\par /60, HR 67, O2 sat 98% sitting\par /60 and HR 69 standing\par \par Close clinical follow up recommended. OV in 1 month and DVC in 3 months. Also recommend referring to EP for AT/SVT.\par \par Neurology referral placed.\par \par Labs ordered.\par \par F/U after testing to review results.\par Discussed red flag symptoms, which would warrant sooner or emergent medical evaluation.\par Any questions and concerns were addressed and resolved.\par \par Sincerely,\par [facility]\par Patient's history, testing, and plan was reviewed with supervising physician, Dr. Nichole Almeida

## 2021-08-09 ENCOUNTER — APPOINTMENT (OUTPATIENT)
Dept: ELECTROPHYSIOLOGY | Facility: CLINIC | Age: 81
End: 2021-08-09

## 2021-08-09 ENCOUNTER — APPOINTMENT (OUTPATIENT)
Dept: CARDIOLOGY | Facility: CLINIC | Age: 81
End: 2021-08-09

## 2022-06-06 ENCOUNTER — APPOINTMENT (OUTPATIENT)
Dept: CARDIOLOGY | Facility: CLINIC | Age: 82
End: 2022-06-06
Payer: MEDICAID

## 2022-06-06 VITALS
OXYGEN SATURATION: 99 % | TEMPERATURE: 97.3 F | SYSTOLIC BLOOD PRESSURE: 138 MMHG | HEIGHT: 64 IN | HEART RATE: 80 BPM | BODY MASS INDEX: 26.46 KG/M2 | DIASTOLIC BLOOD PRESSURE: 70 MMHG | WEIGHT: 155 LBS

## 2022-06-06 PROCEDURE — 93280 PM DEVICE PROGR EVAL DUAL: CPT

## 2022-06-06 NOTE — PROCEDURE
[Pacemaker] : pacemaker [Lead Imp:  ___ohms] : lead impedance was [unfilled] ohms [Sensing Amplitude ___mv] : sensing amplitude was [unfilled] mv [___V @] : [unfilled] V [___ ms] : [unfilled] ms [de-identified] : Medtronic [de-identified] : Adaptkamilah ADDRS1 [de-identified] : [id number] [de-identified] : 4/6/2020 [de-identified] : AAIR<=>DDDR [de-identified] :  [de-identified] : Battery longevity 12.4 years. [de-identified] : AP 60%\par  <0.1%\par \par HVR reviewed. \par 1 - 3 beat run of NSVT\par Other episodes 1:1 AV conduction\par \par Settings:\par RA: Amplitude 1.5V (auto), pulse width 0.4ms (auto), sensitivity 0.3mV\par RV: Amplitude 2V (Auto), pulse width 0.4ms (auto), sensitivity 1.2mV\par \par Denies CP, SOB. Reports palpitations and feeling imbalanced.\par \par On Metoprolol.\par \par Discussed red flag symptoms, which would warrant sooner or emergent medical evaluation.\par Any questions and concerns were addressed and resolved.\par \par Sincerely,\par \par Vani Crum PA-C\par Patients history, testing and plan reviewed with supervising MD: Dr. Patrick Valentino

## 2022-07-27 ENCOUNTER — APPOINTMENT (OUTPATIENT)
Dept: ELECTROPHYSIOLOGY | Facility: CLINIC | Age: 82
End: 2022-07-27

## 2022-09-12 ENCOUNTER — APPOINTMENT (OUTPATIENT)
Dept: CARDIOLOGY | Facility: CLINIC | Age: 82
End: 2022-09-12

## 2022-10-06 ENCOUNTER — APPOINTMENT (OUTPATIENT)
Dept: CARDIOLOGY | Facility: CLINIC | Age: 82
End: 2022-10-06

## 2022-10-06 VITALS
OXYGEN SATURATION: 97 % | DIASTOLIC BLOOD PRESSURE: 64 MMHG | HEIGHT: 64 IN | HEART RATE: 70 BPM | WEIGHT: 154 LBS | SYSTOLIC BLOOD PRESSURE: 126 MMHG | TEMPERATURE: 97.8 F | BODY MASS INDEX: 26.29 KG/M2

## 2022-10-06 PROCEDURE — 99215 OFFICE O/P EST HI 40 MIN: CPT

## 2022-10-06 NOTE — PHYSICAL EXAM
[General Appearance - Well Developed] : well developed [Normal Appearance] : normal appearance [Well Groomed] : well groomed [General Appearance - Well Nourished] : well nourished [No Deformities] : no deformities [General Appearance - In No Acute Distress] : no acute distress [Normal Conjunctiva] : the conjunctiva exhibited no abnormalities [Eyelids - No Xanthelasma] : the eyelids demonstrated no xanthelasmas [No Oral Pallor] : no oral pallor [No Oral Cyanosis] : no oral cyanosis [Respiration, Rhythm And Depth] : normal respiratory rhythm and effort [Exaggerated Use Of Accessory Muscles For Inspiration] : no accessory muscle use [Auscultation Breath Sounds / Voice Sounds] : lungs were clear to auscultation bilaterally [Heart Rate And Rhythm] : heart rate and rhythm were normal [Heart Sounds] : normal S1 and S2 [Murmurs] : no murmurs present [Edema] : no peripheral edema present [Abdomen Soft] : soft [Abdomen Tenderness] : non-tender [Abdomen Mass (___ Cm)] : no abdominal mass palpated [Abnormal Walk] : normal gait [Gait - Sufficient For Exercise Testing] : the gait was sufficient for exercise testing [Nail Clubbing] : no clubbing of the fingernails [Cyanosis, Localized] : no localized cyanosis [Petechial Hemorrhages (___cm)] : no petechial hemorrhages [] : no ischemic changes [Skin Color & Pigmentation] : normal skin color and pigmentation [Mood] : the mood was normal [Normal] : clear lung fields, good air entry, no respiratory distress [FreeTextEntry1] : forgetful

## 2022-10-06 NOTE — HISTORY OF PRESENT ILLNESS
[FreeTextEntry1] : 80F w/ PMH of HTN, DM, HLD, active smoker, PPM s/p gen change April 2020, frequent falls. She has very unstable gait at baseline. She has limited functional status. \par Recent admit to [facility], discharged in stable condition. Meds were restarted.\par \par \par \par \par \par Her device interrogation had no events from July until 2/4 and 2/10, 2021. There were 3 episodes of SVT 1-6 mins, 150-180s. This did not correlate with falls/syncope. She is on beta blocker. \par \par LVEF March 2020 was normal. Nuclear stress test Nov 2018 no significant ischemia. [place]ing [place] [facility] \par \par She denies chest pain, unusual sob, orthopnea, edema, palpitations. Denies abnormal bleeding/black stool.

## 2022-10-06 NOTE — ADDENDUM
[FreeTextEntry1] : Rec'd CT scans date was actually 1/29/21 at [facility], no acute fracture of c-spine or facial bones. No acute intracranial abnormality.

## 2022-10-06 NOTE — DISCUSSION/SUMMARY
[FreeTextEntry1] : ASAF JOHN is a 82 year old F who presents today Feb 16, 2021 in hospital followup after a syncopal episode on 2/9/21, awaiting records. \par \par 1. Syncope in the past:  was sec to dehydration.\par \par 2. SVT: No clear correlation with falls/above symptoms. No recent episode >6mins. Pt on beta blocker. Check echo to monitor LVEF. Will review labs for electrolyte status. Unable to uptitrate BB d/t orthostasis at this time. Will reassess in office in 2 weeks to see if possible. \par \par 3. PPM: stable function today, normal battery and lead measurements. \par cont present meds\par PPM follow up\par Cardiology follow up 6 months and as needed.\par \par 4. Claudication: Cont baby ASA daily. Walking ability is limited. Chronic smoking is noted. Check abd US to r/o aneurysm/obstruction. \par \par Recent hospitalization.  She stopped taking her medications.  It was restarted.  She was discharged home in stable condition.  Her pacemaker is interrogated every 6 months through our office.  She claims she is compliant with medications after discharge.  Blood pressure is stable.  She already has scheduled appointment for pacemaker interrogation, electrophysiology follow-up.  Cardiology follow-up 6 months and as needed.

## 2022-10-06 NOTE — REASON FOR VISIT
[Follow-Up - From Hospitalization] : follow-up of a recent hospitalization for [Syncope] : syncope [Discharge Date: ___] : Discharge Date: [unfilled] [Friend] : friend [FreeTextEntry1] : Follow-up.  Recent admission to the hospital.  Chief complaint was altered mental status.  She was not taking her medications for 4 days.  She had no reason to stop taking medications.  They were restarted.  Hospital course was unremarkable.  She was discharged in stable condition.  On today's visit she reports no symptoms of any chest pains or shortness of breath.  No syncope or near syncope.

## 2022-11-16 ENCOUNTER — NON-APPOINTMENT (OUTPATIENT)
Age: 82
End: 2022-11-16

## 2022-11-18 ENCOUNTER — RX RENEWAL (OUTPATIENT)
Age: 82
End: 2022-11-18

## 2022-11-21 RX ORDER — BLOOD-GLUCOSE METER
W/DEVICE KIT MISCELLANEOUS
Qty: 1 | Refills: 0 | Status: ACTIVE | COMMUNITY
Start: 2022-11-21

## 2022-11-21 RX ORDER — LANCETS 33 GAUGE
EACH MISCELLANEOUS
Qty: 270 | Refills: 1 | Status: ACTIVE | COMMUNITY
Start: 2022-11-17

## 2022-11-21 RX ORDER — BLOOD-GLUCOSE METER
W/DEVICE EACH MISCELLANEOUS
Qty: 1 | Refills: 0 | Status: ACTIVE | COMMUNITY
Start: 2022-11-17

## 2022-11-21 RX ORDER — BLOOD SUGAR DIAGNOSTIC
STRIP MISCELLANEOUS 3 TIMES DAILY
Qty: 270 | Refills: 1 | Status: ACTIVE | COMMUNITY
Start: 2022-11-17

## 2022-11-30 NOTE — PROCEDURE
[Pacemaker] : pacemaker [Lead Imp:  ___ohms] : lead impedance was [unfilled] ohms [Sensing Amplitude ___mv] : sensing amplitude was [unfilled] mv [___V @] : [unfilled] V [___ ms] : [unfilled] ms [de-identified] : Medtronic [de-identified] : [employer] ADDRS1 [de-identified] : [id number] [de-identified] : 4/6/2020 [de-identified] : AAIR<=>DDDR [de-identified] :  [de-identified] : Battery longevity 13.5 years. [de-identified] : AP 48.5%\par  <0.1%\par \par Settings:\par RA: Amplitude 1.5V (auto), pulse width 0.4ms (auto), sensitivity 0.3mV\par RV: Amplitude 2V (Auto), pulse width 0.4ms (auto), sensitivity 1.2mV\par \par 7 episodes of AT. 2 labeled NSVT, but appear to be 1:1 conduction.Longest 7 and a half minutes in duration. Rates 188 bpm. On betablocker. Patient is unsure if sx's correlate with any specific times. Since last seen she has had multiple falls (known history). 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 Estrada. I asked her when the fall was and she and her friend responded "either March, April, May, or June" indicating poor historians.\par \par Denies CP, SOB. Reports palpitations and feeling imbalanced.\par \par On Metoprolol.\par \par Prior hx of orthopstatic hypotension.\par /60, HR 67, O2 sat 98% sitting\par /60 and HR 69 standing\par \par Close clinical follow up recommended. OV in 1 month and DVC in 3 months. Also recommend referring to EP for AT/SVT.\par \par Neurology referral placed.\par \par Labs ordered.\par \par F/U after testing to review results.\par Discussed red flag symptoms, which would warrant sooner or emergent medical evaluation.\par Any questions and concerns were addressed and resolved.\par \par Sincerely,\par [facility]\par Patient's history, testing, and plan was reviewed with supervising physician, Dr. Nichole Almeida

## 2022-12-06 ENCOUNTER — APPOINTMENT (OUTPATIENT)
Dept: CARDIOLOGY | Facility: CLINIC | Age: 82
End: 2022-12-06

## 2022-12-08 PROBLEM — N39.3 STRESS INCONTINENCE, FEMALE: Status: ACTIVE | Noted: 2018-10-24

## 2022-12-08 NOTE — ADDENDUM
[FreeTextEntry1] : Rec'd CT scans date was actually 1/29/21 at [facility], no acute fracture of c-spine or facial bones. No acute intracranial abnormality.

## 2022-12-08 NOTE — PROCEDURE
[Pacemaker] : pacemaker [Lead Imp:  ___ohms] : lead impedance was [unfilled] ohms [Sensing Amplitude ___mv] : sensing amplitude was [unfilled] mv [___V @] : [unfilled] V [___ ms] : [unfilled] ms [de-identified] : Medtronic [de-identified] : [employer] ADDRS1 [de-identified] : [id number] [de-identified] : 4/6/2020 [de-identified] : AAIR<=>DDDR [de-identified] :  [de-identified] : Battery longevity 13.5 years. [de-identified] : AP 48.5%\par  <0.1%\par \par Settings:\par RA: Amplitude 1.5V (auto), pulse width 0.4ms (auto), sensitivity 0.3mV\par RV: Amplitude 2V (Auto), pulse width 0.4ms (auto), sensitivity 1.2mV\par \par 7 episodes of AT. 2 labeled NSVT, but appear to be 1:1 conduction.Longest 7 and a half minutes in duration. Rates 188 bpm. On betablocker. Patient is unsure if sx's correlate with any specific times. Since last seen she has had multiple falls (known history). 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 Estrada. I asked her when the fall was and she and her friend responded "either March, April, May, or June" indicating poor historians.\par \par Denies CP, SOB. Reports palpitations and feeling imbalanced.\par \par On Metoprolol.\par \par Prior hx of orthopstatic hypotension.\par /60, HR 67, O2 sat 98% sitting\par /60 and HR 69 standing\par \par Close clinical follow up recommended. OV in 1 month and DVC in 3 months. Also recommend referring to EP for AT/SVT.\par \par Neurology referral placed.\par \par Labs ordered.\par \par F/U after testing to review results.\par Discussed red flag symptoms, which would warrant sooner or emergent medical evaluation.\par Any questions and concerns were addressed and resolved.\par \par Sincerely,\par [facility]\par Patient's history, testing, and plan was reviewed with supervising physician, Dr. Nichole Almeida

## 2022-12-08 NOTE — PHYSICAL EXAM
[Normal] : clear lung fields, good air entry, no respiratory distress [General Appearance - Well Developed] : well developed [Normal Appearance] : normal appearance [Well Groomed] : well groomed [General Appearance - Well Nourished] : well nourished [No Deformities] : no deformities [General Appearance - In No Acute Distress] : no acute distress [Normal Conjunctiva] : the conjunctiva exhibited no abnormalities [Eyelids - No Xanthelasma] : the eyelids demonstrated no xanthelasmas [No Oral Pallor] : no oral pallor [No Oral Cyanosis] : no oral cyanosis [Respiration, Rhythm And Depth] : normal respiratory rhythm and effort [Exaggerated Use Of Accessory Muscles For Inspiration] : no accessory muscle use [Auscultation Breath Sounds / Voice Sounds] : lungs were clear to auscultation bilaterally [Heart Rate And Rhythm] : heart rate and rhythm were normal [Heart Sounds] : normal S1 and S2 [Murmurs] : no murmurs present [Edema] : no peripheral edema present [Abdomen Soft] : soft [Abdomen Tenderness] : non-tender [Abdomen Mass (___ Cm)] : no abdominal mass palpated [Abnormal Walk] : normal gait [Gait - Sufficient For Exercise Testing] : the gait was sufficient for exercise testing [Nail Clubbing] : no clubbing of the fingernails [Cyanosis, Localized] : no localized cyanosis [Petechial Hemorrhages (___cm)] : no petechial hemorrhages [] : no ischemic changes [Skin Color & Pigmentation] : normal skin color and pigmentation [Mood] : the mood was normal [FreeTextEntry1] : forgetful

## 2022-12-08 NOTE — HISTORY OF PRESENT ILLNESS
[FreeTextEntry1] : 80F w/ PMH of HTN, DM, HLD, active smoker, PPM s/p gen change April 2020, frequent falls. She has very unstable gait at baseline. She has limited functional status. \par Recent admit to [facility], discharged in stable condition. Meds were restarted.\par \par \par \par \par \par Her device interrogation had no events from July until 2/4 and 2/10, 2021. There were 3 episodes of SVT 1-6 mins, 150-180s. This did not correlate with falls/syncope. She is on beta blocker. \par \par LVEF March 2020 was normal. Nuclear stress test Nov 2018 no significant ischemia. Awaiting updated labs and scans from [facility]. \par \par She denies chest pain, unusual sob, orthopnea, edema, palpitations. Denies abnormal bleeding/black stool.  EKG completed

## 2022-12-08 NOTE — REASON FOR VISIT
[FreeTextEntry1] : Follow-up.  Recent admission to the hospital.  Chief complaint was altered mental status.  She was not taking her medications for 4 days.  She had no reason to stop taking medications.  They were restarted.  Hospital course was unremarkable.  She was discharged in stable condition.  On today's visit she reports no symptoms of any chest pains or shortness of breath.  No syncope or near syncope. [Follow-Up - From Hospitalization] : follow-up of a recent hospitalization for [Syncope] : syncope [Discharge Date: ___] : Discharge Date: [unfilled] [Friend] : friend

## 2022-12-13 ENCOUNTER — APPOINTMENT (OUTPATIENT)
Dept: CARDIOLOGY | Facility: CLINIC | Age: 82
End: 2022-12-13

## 2022-12-13 DIAGNOSIS — N39.3 STRESS INCONTINENCE (FEMALE) (MALE): ICD-10-CM

## 2022-12-13 NOTE — PROCEDURE
[Pacemaker] : pacemaker [Lead Imp:  ___ohms] : lead impedance was [unfilled] ohms [Sensing Amplitude ___mv] : sensing amplitude was [unfilled] mv [___V @] : [unfilled] V [___ ms] : [unfilled] ms [de-identified] : Medtronic [de-identified] : [employer] ADDRS1 [de-identified] : [id number] [de-identified] : 4/6/2020 [de-identified] : AAIR<=>DDDR [de-identified] :  [de-identified] : Battery longevity 12.4 years. [de-identified] : AP 60%\par  <0.1%\par \par HVR reviewed. \par 1 - 3 beat run of NSVT\par Other episodes 1:1 AV conduction\par \par Settings:\par RA: Amplitude 1.5V (auto), pulse width 0.4ms (auto), sensitivity 0.3mV\par RV: Amplitude 2V (Auto), pulse width 0.4ms (auto), sensitivity 1.2mV\par \par Denies CP, SOB. Reports palpitations and feeling imbalanced.\par \par On Metoprolol.\par \par

## 2022-12-28 ENCOUNTER — APPOINTMENT (OUTPATIENT)
Dept: ELECTROPHYSIOLOGY | Facility: CLINIC | Age: 82
End: 2022-12-28

## 2023-01-12 ENCOUNTER — APPOINTMENT (OUTPATIENT)
Dept: CARDIOLOGY | Facility: CLINIC | Age: 83
End: 2023-01-12

## 2023-01-25 ENCOUNTER — APPOINTMENT (OUTPATIENT)
Dept: ELECTROPHYSIOLOGY | Facility: CLINIC | Age: 83
End: 2023-01-25

## 2023-03-29 ENCOUNTER — FORM ENCOUNTER (OUTPATIENT)
Age: 83
End: 2023-03-29

## 2023-06-04 ENCOUNTER — FORM ENCOUNTER (OUTPATIENT)
Age: 83
End: 2023-06-04

## 2023-08-17 RX ORDER — CLONAZEPAM 1 MG/1
1 TABLET ORAL
Refills: 0 | Status: DISCONTINUED | COMMUNITY
End: 2023-08-17

## 2023-08-18 ENCOUNTER — APPOINTMENT (OUTPATIENT)
Dept: CARDIOLOGY | Facility: CLINIC | Age: 83
End: 2023-08-18
Payer: MEDICARE

## 2023-08-18 ENCOUNTER — NON-APPOINTMENT (OUTPATIENT)
Age: 83
End: 2023-08-18

## 2023-08-18 VITALS
BODY MASS INDEX: 25.95 KG/M2 | WEIGHT: 152 LBS | DIASTOLIC BLOOD PRESSURE: 80 MMHG | OXYGEN SATURATION: 98 % | HEART RATE: 80 BPM | SYSTOLIC BLOOD PRESSURE: 154 MMHG | HEIGHT: 64 IN

## 2023-08-18 DIAGNOSIS — Z87.898 PERSONAL HISTORY OF OTHER SPECIFIED CONDITIONS: ICD-10-CM

## 2023-08-18 PROCEDURE — 93280 PM DEVICE PROGR EVAL DUAL: CPT

## 2023-08-18 PROCEDURE — 99215 OFFICE O/P EST HI 40 MIN: CPT | Mod: 25

## 2023-08-18 PROCEDURE — 93000 ELECTROCARDIOGRAM COMPLETE: CPT | Mod: XU

## 2023-08-18 NOTE — DISCUSSION/SUMMARY
[FreeTextEntry1] : ASAF JOHN is a 80 year old F who presents today Feb 16, 2021 in hospital followup after a syncopal episode on 2/9/21, awaiting records. \par  \par  1. Syncope: Pt has significant orthostatic hypotension on exam today. Seems to have poor hydration. She is also on lasix with no history of CHF, fluid volume status stable on exam. There is no edema. Recommend to stop diuretic and increase PO hydration with electrolytes. Avoid excess caffeine. Use caution with position change such as from sit to stand. Use walker at ALL times when ambulating!!!! Fall precautions provided. Will addend note once [facility] [place] have been rec'd if needed. \par  - Check carotid US to r/o obstructive disease. \par  \par  2. SVT: No clear correlation with falls/above symptoms. No recent episode >6mins. Pt on beta blocker. Check echo to monitor LVEF. Will review labs for electrolyte status. Unable to uptitrate BB d/t orthostasis at this time. Will reassess in office in 2 weeks to see if possible. \par  \par  3. PPM: stable function today, normal battery and lead measurements. \par  \par  4. Claudication: Cont baby ASA daily. Walking ability is limited. Chronic smoking is noted. Check abd US to r/o aneurysm/obstruction. \par  \par  F/U PCP to assess if home assistance is needed. \par  Close clinical followup. Reviewed followup details with pt's friend Nemesio who helps care for her. \par  Reviewed red flag symptoms which would warrant emergent medical evaluation. \par  Any questions and concerns were addressed and resolved. \par  \par  Sincerely,\par  \par  JOSE Baker\par  Patients history, testing, and plan reviewed with supervising MD: Dr. Patrick Valentino

## 2023-08-19 RX ORDER — ATORVASTATIN CALCIUM 20 MG/1
20 TABLET, FILM COATED ORAL
Refills: 0 | Status: ACTIVE | COMMUNITY

## 2023-08-19 NOTE — HISTORY OF PRESENT ILLNESS
[FreeTextEntry1] : 83F w/ PMH of HTN, DM, HLD, active smoker, PPM s/p gen change April 2020, frequent falls, significant anxiety. She has very unstable gait at baseline. She has limited functional status.   Presents today with complaints of intermittent CP for weeks. At present there is a discomfort in her left arm feels like a squeezing sensation, symptoms are active while in office. Her EKG shows normal sinus rhythm. Other times her CP radiates across her upper chest. She reports withdrawing from klonopin, She was seen in [facility] ER. She signed out AMA on 8/13/23, there was no blood work drawn.  Pt is extremely disorganized with her thoughts and a poor historian. Her friend Nemesio helps to manage her care.   Her last device interrogation was June 2022, does not have remote monitoring.  *** Device check new onset AF several episodes noted May 2023. Several episodes of SVT noted <5 mins duration c/w history. On beta blocker.   EKG 8/18/23 normal sinus rhythm  LVEF March 2020 was normal. Nuclear stress test Nov 2018 no significant ischemia.

## 2023-08-19 NOTE — PROCEDURE
[No] : not [Pacemaker] : pacemaker [Threshold Testing Performed] : Threshold testing was performed [Lead Imp:  ___ohms] : lead impedance was [unfilled] ohms [Sensing Amplitude ___mv] : sensing amplitude was [unfilled] mv [___V @] : [unfilled] V [___ ms] : [unfilled] ms [None] : none [de-identified] : Medtronic [de-identified] : [employer] ADDRS1 [de-identified] : 4/6/2020 [de-identified] : [id number] [de-identified] : AAIR<=>DDDR [de-identified] :  [de-identified] :  AP 47.7%  <0.1%   Noncompliant with device checks, last >1 year ago There are several episodes of PAF from May 2023. Pt is not on AC.   HVR reviewed.  1 - 3 beat run of NSVT Other episodes 1:1 AV conduction c/w history of SVT, all <5 min duration.  Remains on beta blocker.   Settings: RA: Amplitude 1.5V (auto), pulse width 0.4ms (auto), sensitivity 0.3mV RV: Amplitude 2V (Auto), pulse width 0.4ms (auto), sensitivity 1.2mV  Patient being sent to [facility] ER today to r/o ACS d/t active CP in office. Tasked FD to ensure clinical followup after ER visit to discuss remote monitoring, device checks q3mo, and most importantly to review r/b of AC giving significant fall risk/fall history with new finding of AF. Thankfully last episode was May 2023. Pt verbalized understanding of the importance of clinical followup as discussed.   [de-identified] : Battery longevity 11.2 years.

## 2023-08-19 NOTE — ASSESSMENT
[FreeTextEntry1] : ASAF JOHN is a 83 year old F who presents today Aug 18, 2023 with the above history and the following active issues:   1. CP - active symptoms while in office radiating down L arm. EKG NSR. Sent to ER for further eval and management r/o ACS. Symptomatic and also noted to have SVT on device check when withdrawing from klonopin. Pt would likely benefit from low dose benzo. Recommend management with PCP or psych.   2. PAF - new onset on DVC. Consider d/c ASA and start eliquis however note significant fall risk/history. Emphasized importance of clinical followup with our office after ER visit to discuss r/b. Last episode May 2023 no reported symptoms. Pt counseled of risk of VTE a/w AF.   3. Hx Syncope: ppm functioning, no significant carotid stenosis, normal EF and no sig VHD or ischemic heart diseasse, no reported recurrence  4. SVT: Longstnading, short duration, asx on metoprolol 25mg BID  5. PPM: stable function today, normal battery and lead measurements. Needs to discuss remote monitoring in followup and importance of routine device interrogations as last >1 yr ago.   Needs echo, ischemic eval. Followup after hospitalization.  Tasked FD to arrange Psych/PCP followup emphasized   Sincerely,  JOSE [place]re Patients history, testing, and plan reviewed with supervising MD: Dr. Chase Cason

## 2023-08-22 ENCOUNTER — APPOINTMENT (OUTPATIENT)
Dept: CARDIOLOGY | Facility: CLINIC | Age: 83
End: 2023-08-22
Payer: MEDICARE

## 2023-08-22 ENCOUNTER — NON-APPOINTMENT (OUTPATIENT)
Age: 83
End: 2023-08-22

## 2023-08-22 VITALS
BODY MASS INDEX: 25.95 KG/M2 | DIASTOLIC BLOOD PRESSURE: 60 MMHG | OXYGEN SATURATION: 95 % | HEART RATE: 93 BPM | SYSTOLIC BLOOD PRESSURE: 102 MMHG | HEIGHT: 64 IN | WEIGHT: 152 LBS

## 2023-08-22 DIAGNOSIS — E78.00 PURE HYPERCHOLESTEROLEMIA, UNSPECIFIED: ICD-10-CM

## 2023-08-22 DIAGNOSIS — R06.02 SHORTNESS OF BREATH: ICD-10-CM

## 2023-08-22 PROCEDURE — 99215 OFFICE O/P EST HI 40 MIN: CPT

## 2023-08-22 NOTE — DISCUSSION/SUMMARY
[FreeTextEntry1] : ASAF JOHN is a 83 year old F who presents today Aug 22, 2023 with the above history and the following active issues:  She reports chest pains, SOB, palpitations, headaches, feet swelling, and claudication. Obtain 2d echocardiogram to evaluate resting heart structure, valvular function, and LVEF. Obtain carotid ultrasound to evaluate for evidence of significant carotid atherosclerosis or obstruction. Obtain abdominal ultrasound to evaluate for aortic aneurysm or significant aortic atherosclerosis. Obtain a pharmacological nuclear stress test to evaluate for evidence of myocardial ischemia.  ABIs ordered. Fasting labs ordered.  Cont ASA and statin.  PAF - new onset on DVC. Consider d/c ASA and start eliquis however note significant fall risk/history. Recommend doing the above testing and bringing back with general cardiology and EP same day with close clinical follow up. Feel due to disorganized thinking and fall/risk history it would be best to get specialist's perspective.  Hx Syncope: ppm functioning, no significant carotid stenosis, normal EF and no sig VHD or ischemic heart diseasse, no reported recurrence  SVT: Longstnading, short duration, asx on metoprolol 25mg BID  PPM: stable function 8/18/23, normal battery and lead measurements. EP as above.  Smoking cessation advised.  Ongoing f/u with PCP.  F/U after testing to review results (echo, carotid, abd, nuke, MAGDI, and labs). Discussed red flag symptoms, which would warrant sooner or emergent medical evaluation. Any questions and concerns were addressed and resolved.   Sincerely, [facility] Patient's history, testing, and plan was reviewed with supervising physician, Dr. Nichole Almeida

## 2023-08-22 NOTE — HISTORY OF PRESENT ILLNESS
[FreeTextEntry1] : ASAF JOHN is a 83 year old female with a past medical history of HTN, DM, HLD, active smoker, PPM s/p gen change April 2020, frequent falls, significant anxiety. She has very unstable gait at baseline. She has limited functional status.  Pt is extremely disorganized with her thoughts and a poor historian. Her friend Nemesio helps to manage her care.  *** Device check new onset AF several episodes noted May 2023. Several episodes of SVT noted <5 mins duration c/w history. On beta blocker.  Last seen 8/18/23 with chest pains. She was sent to [facility] ER. Symptomatic and also noted to have SVT on device check when withdrawing from klonopin. Testing as noted below. She signed herself out AMA. She reports chest pains, SOB, palpitations, headaches, feet swelling, and claudication. Denies dizziness, lightheadedness, and syncope. Active smoker.  Testing:  Labs 8/18/23: WBC 8.57, Hgb 11.3, HCT 34.1, plt 245, Trop 16, , K 4.6, Cr 1.1, Ca 9.9, Na 138, CK 60, Mag 2.2  EKG 8/18/23: A paced at 80 bpm, MO interval 234 ms, QTc 463 ms, Q waves in lead III, PRWP, nonspecific ST-T wave abnormalities   US Duplex upper ext veins LTD 8/18/23: No evidence of left upper extremity DVT.  CXR 8/18/23: No acute pulm dz.  EKG 8/18/23 normal sinus rhythm  Echo 2021: EF 55-60%. Mild MR. Minimal AR. Mildly dilated LA. Endocardium NWV; grossly normal LVSF. Mild MO. A color doppler signal is seen along the atrial septum c/w PFO or small atrial septal defect.   Carotid u/s 2021: Mild nonobstructive carotid dz seen BL.  Abd u/s 2021: No AAA. Mild to mod plaque.  Nuclear stress test Nov 2018 no significant ischemia.

## 2023-08-31 ENCOUNTER — APPOINTMENT (OUTPATIENT)
Dept: CARDIOLOGY | Facility: CLINIC | Age: 83
End: 2023-08-31

## 2023-09-19 ENCOUNTER — APPOINTMENT (OUTPATIENT)
Dept: CARDIOLOGY | Facility: CLINIC | Age: 83
End: 2023-09-19

## 2023-10-23 ENCOUNTER — APPOINTMENT (OUTPATIENT)
Dept: CARDIOLOGY | Facility: CLINIC | Age: 83
End: 2023-10-23
Payer: MEDICARE

## 2023-10-23 PROCEDURE — 93306 TTE W/DOPPLER COMPLETE: CPT

## 2023-10-23 PROCEDURE — 93880 EXTRACRANIAL BILAT STUDY: CPT

## 2023-10-25 ENCOUNTER — APPOINTMENT (OUTPATIENT)
Dept: ELECTROPHYSIOLOGY | Facility: CLINIC | Age: 83
End: 2023-10-25

## 2023-11-18 LAB — HBA1C MFR BLD HPLC: 7.5

## 2023-11-20 ENCOUNTER — NON-APPOINTMENT (OUTPATIENT)
Age: 83
End: 2023-11-20

## 2023-11-20 ENCOUNTER — APPOINTMENT (OUTPATIENT)
Dept: CARDIOLOGY | Facility: CLINIC | Age: 83
End: 2023-11-20

## 2023-12-06 ENCOUNTER — APPOINTMENT (OUTPATIENT)
Dept: CARDIOLOGY | Facility: CLINIC | Age: 83
End: 2023-12-06

## 2023-12-20 NOTE — HISTORY OF PRESENT ILLNESS
[FreeTextEntry1] : Left arm swelling: The patient is status post permanent pacemaker implant on the left side.  The patient recently noted noted left upper extremity edema.  There is no edema today.  She states it is somewhat intermittent.\par \par Shortness of breath: Today the patient noted exertional shortness of breath after moderate exertion.  This is unusual for her.\par \par Overall I have recommended emergency room evaluation for upper extremity duplex as well as CTA of the pulmonary vasculature.  Reviewed with Dr. Mayo in the ER.\par \par Hypertension: Well-controlled.\par \par Bradycardia: Patient is status post permanent pacemaker implant.
no

## 2024-02-13 ENCOUNTER — APPOINTMENT (OUTPATIENT)
Dept: ENDOCRINOLOGY | Facility: CLINIC | Age: 84
End: 2024-02-13

## 2024-02-27 PROBLEM — R07.9 CHEST PAIN, UNSPECIFIED TYPE: Status: ACTIVE | Noted: 2023-08-18

## 2024-02-27 PROBLEM — R00.1 BRADYCARDIA, UNSPECIFIED: Status: ACTIVE | Noted: 2018-02-27

## 2024-02-27 PROBLEM — M79.89 LEFT ARM SWELLING: Status: ACTIVE | Noted: 2018-03-21

## 2024-02-27 PROBLEM — R29.6 FREQUENT FALLS: Status: ACTIVE | Noted: 2019-05-30

## 2024-02-27 PROBLEM — I73.9 CLAUDICATION: Status: ACTIVE | Noted: 2018-10-24

## 2024-02-28 ENCOUNTER — APPOINTMENT (OUTPATIENT)
Dept: CARDIOLOGY | Facility: CLINIC | Age: 84
End: 2024-02-28
Payer: MEDICARE

## 2024-02-28 ENCOUNTER — APPOINTMENT (OUTPATIENT)
Dept: ELECTROPHYSIOLOGY | Facility: CLINIC | Age: 84
End: 2024-02-28
Payer: MEDICARE

## 2024-02-28 ENCOUNTER — APPOINTMENT (OUTPATIENT)
Dept: CARDIOLOGY | Facility: CLINIC | Age: 84
End: 2024-02-28

## 2024-02-28 VITALS
OXYGEN SATURATION: 96 % | DIASTOLIC BLOOD PRESSURE: 60 MMHG | HEART RATE: 86 BPM | SYSTOLIC BLOOD PRESSURE: 120 MMHG | HEIGHT: 64 IN

## 2024-02-28 DIAGNOSIS — I48.0 PAROXYSMAL ATRIAL FIBRILLATION: ICD-10-CM

## 2024-02-28 DIAGNOSIS — I47.10 SUPRAVENTRICULAR TACHYCARDIA, UNSPECIFIED: ICD-10-CM

## 2024-02-28 DIAGNOSIS — R55 SYNCOPE AND COLLAPSE: ICD-10-CM

## 2024-02-28 DIAGNOSIS — R07.9 CHEST PAIN, UNSPECIFIED: ICD-10-CM

## 2024-02-28 DIAGNOSIS — I10 ESSENTIAL (PRIMARY) HYPERTENSION: ICD-10-CM

## 2024-02-28 DIAGNOSIS — Z95.0 PRESENCE OF CARDIAC PACEMAKER: ICD-10-CM

## 2024-02-28 DIAGNOSIS — Z87.898 PERSONAL HISTORY OF OTHER SPECIFIED CONDITIONS: ICD-10-CM

## 2024-02-28 DIAGNOSIS — M79.89 OTHER SPECIFIED SOFT TISSUE DISORDERS: ICD-10-CM

## 2024-02-28 DIAGNOSIS — R00.1 BRADYCARDIA, UNSPECIFIED: ICD-10-CM

## 2024-02-28 DIAGNOSIS — R29.6 REPEATED FALLS: ICD-10-CM

## 2024-02-28 DIAGNOSIS — Z79.899 OTHER LONG TERM (CURRENT) DRUG THERAPY: ICD-10-CM

## 2024-02-28 DIAGNOSIS — I73.9 PERIPHERAL VASCULAR DISEASE, UNSPECIFIED: ICD-10-CM

## 2024-02-28 DIAGNOSIS — E11.9 TYPE 2 DIABETES MELLITUS W/OUT COMPLICATIONS: ICD-10-CM

## 2024-02-28 DIAGNOSIS — I95.1 ORTHOSTATIC HYPOTENSION: ICD-10-CM

## 2024-02-28 PROCEDURE — 99213 OFFICE O/P EST LOW 20 MIN: CPT

## 2024-02-28 PROCEDURE — 99214 OFFICE O/P EST MOD 30 MIN: CPT

## 2024-02-28 PROCEDURE — G2211 COMPLEX E/M VISIT ADD ON: CPT

## 2024-02-28 RX ORDER — METOPROLOL TARTRATE 25 MG/1
25 TABLET, FILM COATED ORAL
Qty: 270 | Refills: 3 | Status: ACTIVE | COMMUNITY
Start: 2018-07-09 | End: 1900-01-01

## 2024-02-28 NOTE — HISTORY OF PRESENT ILLNESS
[FreeTextEntry1] : ASAF JOHN is a 83 year old female with a past medical history of HTN, DM, HLD, active smoker, AF no AC, PPM s/p gen change April 2020, frequent falls, significant anxiety. She has very unstable gait at baseline. She has limited functional status.  Pt is extremely disorganized with her thoughts and a poor historian. Her friend Nemesio helps to manage her care.  *** Device check new onset AF several episodes noted May 2023. Several episodes of SVT noted <5 mins duration c/w history. On beta blocker.  Last seen 8/18/23 with chest pains. She was sent to [facility] ER. Symptomatic and also noted to have SVT on device check when withdrawing from klonopin. Testing as noted below. She signed herself out AMA. She reports chest pains, SOB, palpitations, headaches, feet swelling, and claudication. Denies dizziness, lightheadedness, and syncope. Active smoker.  Testing:  Labs 8/18/23: WBC 8.57, Hgb 11.3, HCT 34.1, plt 245, Trop 16, , K 4.6, Cr 1.1, Ca 9.9, Na 138, CK 60, Mag 2.2  EKG 8/18/23: A paced at 80 bpm, CO interval 234 ms, QTc 463 ms, Q waves in lead III, PRWP, nonspecific ST-T wave abnormalities   US Duplex upper ext veins LTD 8/18/23: No evidence of left upper extremity DVT.  CXR 8/18/23: No acute pulm dz.  EKG 8/18/23 normal sinus rhythm  Echo 2021: EF 55-60%. Mild MR. Minimal AR. Mildly dilated LA. Endocardium NWV; grossly normal LVSF. Mild CO. A color doppler signal is seen along the atrial septum c/w PFO or small atrial septal defect.   Carotid u/s 2021: Mild nonobstructive carotid dz seen BL.  Abd u/s 2021: No AAA. Mild to mod plaque.  Nuclear stress test Nov 2018 no significant ischemia.

## 2024-02-28 NOTE — DISCUSSION/SUMMARY
[FreeTextEntry1] : - Atypical chest pain resolved, normal perfusion Myoview stress test with normal LVEF Nov 2018  - AF no AC in the setting of gait abnormality, falling  - Psychiatric history bipolar following with psychiatrist  - PPM check today Dr Francois, continue surveillance every 3 months  Patient educated on heart healthy diet. Recommend increased oral hydration with electrolyte supplement drinks, avoid excess alcohol and caffeine.  Patient is aware to call with any symptoms or concerns.   Cardiology follow-up 6 months.  Current cardiac medications remain unchanged and renewals  are up to date. Repeat labs will be ordered with PMD.  Brandy will follow-up with Dr. Max Chandra for primary care  Total time spent 45 minutes, reviewing of test results, chart information, patient discussion, physical exam and completion of chart documentation.

## 2024-03-01 PROBLEM — Z95.0 PRESENCE OF CARDIAC PACEMAKER: Status: ACTIVE | Noted: 2018-02-27

## 2024-03-01 NOTE — HISTORY OF PRESENT ILLNESS
[FreeTextEntry1] : f/u for PM eval h/o AF - not on AC because of fall issuses c/o about her medications No chest pain or SOB

## 2024-03-01 NOTE — PHYSICAL EXAM
[No Acute Distress] : no acute distress [Normal Venous Pressure] : normal venous pressure [Normal S1, S2] : normal S1, S2 [Clear Lung Fields] : clear lung fields [No Edema] : no edema

## 2024-03-01 NOTE — REVIEW OF SYSTEMS
[Feeling Fatigued] : not feeling fatigued [SOB] : no shortness of breath [Cough] : no cough [Abdominal Pain] : no abdominal pain [Dizziness] : no dizziness [Confusion] : confusion was observed

## 2024-08-19 NOTE — DISCUSSION/SUMMARY
[FreeTextEntry1] : Patient has medical history detailed above and active medical issues including:  - Atypical chest pain resolved, normal perfusion Myoview stress test with normal LVEF Nov 2018  - AF no AC in the setting of gait abnormality, falling  - Psychiatric history bipolar following with psychiatrist  - [facility] check today Dr Francois, continue surveillance every 3 months  Patient educated on heart healthy diet. Recommend increased oral hydration with electrolyte supplement drinks, avoid excess alcohol and caffeine.  Patient is aware to call with any symptoms or concerns.   Cardiology follow-up 6 months.  Current cardiac medications remain unchanged and renewals  are up to date. Repeat labs will be ordered with PMD.  Brandy will follow-up with Dr. Max Chandra for primary care  Total time spent 45 minutes, reviewing of test results, chart information, patient discussion, physical exam and completion of chart documentation.

## 2024-08-20 ENCOUNTER — APPOINTMENT (OUTPATIENT)
Dept: CARDIOLOGY | Facility: CLINIC | Age: 84
End: 2024-08-20

## 2024-08-20 DIAGNOSIS — I73.9 PERIPHERAL VASCULAR DISEASE, UNSPECIFIED: ICD-10-CM

## 2024-08-20 DIAGNOSIS — I47.10 SUPRAVENTRICULAR TACHYCARDIA, UNSPECIFIED: ICD-10-CM

## 2024-08-20 DIAGNOSIS — I10 ESSENTIAL (PRIMARY) HYPERTENSION: ICD-10-CM

## 2024-08-20 DIAGNOSIS — R00.1 BRADYCARDIA, UNSPECIFIED: ICD-10-CM

## 2024-08-20 DIAGNOSIS — Z95.0 PRESENCE OF CARDIAC PACEMAKER: ICD-10-CM

## 2024-08-20 DIAGNOSIS — I48.0 PAROXYSMAL ATRIAL FIBRILLATION: ICD-10-CM

## 2024-08-20 DIAGNOSIS — R29.6 REPEATED FALLS: ICD-10-CM

## 2024-08-20 DIAGNOSIS — R07.9 CHEST PAIN, UNSPECIFIED: ICD-10-CM

## 2024-09-10 ENCOUNTER — NON-APPOINTMENT (OUTPATIENT)
Age: 84
End: 2024-09-10

## 2024-09-11 ENCOUNTER — APPOINTMENT (OUTPATIENT)
Dept: CARDIOLOGY | Facility: CLINIC | Age: 84
End: 2024-09-11
Payer: MEDICARE

## 2024-09-11 VITALS
OXYGEN SATURATION: 96 % | HEIGHT: 64 IN | HEART RATE: 83 BPM | BODY MASS INDEX: 25.44 KG/M2 | DIASTOLIC BLOOD PRESSURE: 66 MMHG | SYSTOLIC BLOOD PRESSURE: 132 MMHG | WEIGHT: 149 LBS

## 2024-09-11 DIAGNOSIS — Z79.899 OTHER LONG TERM (CURRENT) DRUG THERAPY: ICD-10-CM

## 2024-09-11 DIAGNOSIS — E78.00 PURE HYPERCHOLESTEROLEMIA, UNSPECIFIED: ICD-10-CM

## 2024-09-11 DIAGNOSIS — M79.89 OTHER SPECIFIED SOFT TISSUE DISORDERS: ICD-10-CM

## 2024-09-11 DIAGNOSIS — I10 ESSENTIAL (PRIMARY) HYPERTENSION: ICD-10-CM

## 2024-09-11 DIAGNOSIS — I47.10 SUPRAVENTRICULAR TACHYCARDIA, UNSPECIFIED: ICD-10-CM

## 2024-09-11 DIAGNOSIS — R07.9 CHEST PAIN, UNSPECIFIED: ICD-10-CM

## 2024-09-11 DIAGNOSIS — Z87.898 PERSONAL HISTORY OF OTHER SPECIFIED CONDITIONS: ICD-10-CM

## 2024-09-11 DIAGNOSIS — R29.6 REPEATED FALLS: ICD-10-CM

## 2024-09-11 DIAGNOSIS — Z95.0 PRESENCE OF CARDIAC PACEMAKER: ICD-10-CM

## 2024-09-11 DIAGNOSIS — R55 SYNCOPE AND COLLAPSE: ICD-10-CM

## 2024-09-11 DIAGNOSIS — R00.1 BRADYCARDIA, UNSPECIFIED: ICD-10-CM

## 2024-09-11 DIAGNOSIS — I48.0 PAROXYSMAL ATRIAL FIBRILLATION: ICD-10-CM

## 2024-09-11 PROCEDURE — 99215 OFFICE O/P EST HI 40 MIN: CPT | Mod: 25

## 2024-09-11 PROCEDURE — 93280 PM DEVICE PROGR EVAL DUAL: CPT

## 2024-09-11 PROCEDURE — G2211 COMPLEX E/M VISIT ADD ON: CPT

## 2024-09-11 RX ORDER — CLONAZEPAM 1 MG/1
1 TABLET ORAL AT BEDTIME
Refills: 0 | Status: ACTIVE | COMMUNITY

## 2024-09-11 RX ORDER — METOPROLOL SUCCINATE 100 MG/1
100 TABLET, EXTENDED RELEASE ORAL DAILY
Qty: 1 | Refills: 3 | Status: ACTIVE | COMMUNITY
Start: 2024-09-11 | End: 1900-01-01
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
## 2024-09-11 NOTE — DISCUSSION/SUMMARY
[FreeTextEntry1] : Patient has medical history detailed above and active medical issues including:  - Atypical chest pain resolved, normal perfusion Myoview stress test with normal LVEF Nov 2018  - PAF low burden no AC in the setting of gait abnormality, falling.  Lopressor increased from 75 Mg daily to 100 Mg daily.   - Psychiatric history bipolar following with psychiatrist  - PPM check today, continue surveillance every 3 months  -Tobacco addiction.  Smoking cessation has been discussed at length, patient will make an effort to reduce smoking and quit.  - Alcoholism, 43 years sober  Patient educated on heart healthy diet. Recommend increased oral hydration with electrolyte supplement drinks, avoid excess alcohol and caffeine.  Patient is aware to call with any symptoms or concerns.   Cardiology follow-up 6 months.  Current cardiac medications remain unchanged and renewals  are up to date. Repeat labs will be ordered with PMD.  Brandy will follow-up with Dr. Max Chandra for primary care  Total time spent 45 minutes, reviewing of test results, chart information, patient discussion, physical exam and completion of chart documentation.

## 2024-09-11 NOTE — PROCEDURE
[No] : not [Pacemaker] : pacemaker [Threshold Testing Performed] : Threshold testing was performed [Lead Imp:  ___ohms] : lead impedance was [unfilled] ohms [Sensing Amplitude ___mv] : sensing amplitude was [unfilled] mv [___V @] : [unfilled] V [___ ms] : [unfilled] ms [None] : none [de-identified] : Medtronic [de-identified] : [employer] ADDRS1 [de-identified] : [id number] [de-identified] : 4/6/2020 [de-identified] : AAIR<=>DDDR [de-identified] :  [de-identified] : Battery longevity 10.2 years. [de-identified] : AP 25.2%  <0.1%   Noncompliant with device checks. There are several episodes of PAF from May 2023 on prior DVC. Pt is not on AC.   Again multiple episodes of AF, some with RVR, highest rate 130's. Longest episode 28 minutes. Although, rates controlled overall by histogram. 1 episode of NSVT 1 second. Rate 176 bpm. Patient is seeing Dr. Valentino today.  DVC in 3 months.   Settings: RA: Amplitude 1.5V (auto), pulse width 0.4ms (auto), sensitivity 0.45mV RV: Amplitude 2V (Auto), pulse width 0.4ms (auto), sensitivity 1.2mV  F/U DVC in 3 months. Discussed red flag symptoms, which would warrant sooner or emergent medical evaluation. Any questions and concerns were addressed and resolved.  Sincerely, [facility] Patient's history, testing, and plan was reviewed with supervising physician, Dr. Patrick Valentino

## 2024-10-03 ENCOUNTER — APPOINTMENT (OUTPATIENT)
Dept: CARDIOLOGY | Facility: CLINIC | Age: 84
End: 2024-10-03

## 2024-10-25 ENCOUNTER — APPOINTMENT (OUTPATIENT)
Dept: CARDIOLOGY | Facility: CLINIC | Age: 84
End: 2024-10-25
Payer: MEDICARE

## 2024-10-25 PROCEDURE — 76376 3D RENDER W/INTRP POSTPROCES: CPT

## 2024-10-25 PROCEDURE — 93306 TTE W/DOPPLER COMPLETE: CPT

## 2024-11-27 ENCOUNTER — APPOINTMENT (OUTPATIENT)
Dept: CARDIOLOGY | Facility: CLINIC | Age: 84
End: 2024-11-27

## 2024-11-27 DIAGNOSIS — R29.6 REPEATED FALLS: ICD-10-CM

## 2024-11-27 DIAGNOSIS — I10 ESSENTIAL (PRIMARY) HYPERTENSION: ICD-10-CM

## 2024-11-27 DIAGNOSIS — I73.9 PERIPHERAL VASCULAR DISEASE, UNSPECIFIED: ICD-10-CM

## 2024-11-27 DIAGNOSIS — Z95.0 PRESENCE OF CARDIAC PACEMAKER: ICD-10-CM

## 2024-11-27 DIAGNOSIS — I47.10 SUPRAVENTRICULAR TACHYCARDIA, UNSPECIFIED: ICD-10-CM

## 2024-11-27 DIAGNOSIS — M79.89 OTHER SPECIFIED SOFT TISSUE DISORDERS: ICD-10-CM

## 2024-11-27 DIAGNOSIS — I48.0 PAROXYSMAL ATRIAL FIBRILLATION: ICD-10-CM

## 2024-11-27 DIAGNOSIS — R00.1 BRADYCARDIA, UNSPECIFIED: ICD-10-CM

## 2024-11-27 DIAGNOSIS — Z79.899 OTHER LONG TERM (CURRENT) DRUG THERAPY: ICD-10-CM

## 2024-11-27 DIAGNOSIS — R07.9 CHEST PAIN, UNSPECIFIED: ICD-10-CM

## 2024-11-27 DIAGNOSIS — Z87.898 PERSONAL HISTORY OF OTHER SPECIFIED CONDITIONS: ICD-10-CM

## 2025-02-05 ENCOUNTER — APPOINTMENT (OUTPATIENT)
Dept: ENDOCRINOLOGY | Facility: CLINIC | Age: 85
End: 2025-02-05
Payer: MEDICARE

## 2025-02-05 VITALS
HEIGHT: 64 IN | HEART RATE: 78 BPM | SYSTOLIC BLOOD PRESSURE: 122 MMHG | OXYGEN SATURATION: 97 % | DIASTOLIC BLOOD PRESSURE: 80 MMHG | TEMPERATURE: 97.2 F

## 2025-02-05 DIAGNOSIS — N39.0 URINARY TRACT INFECTION, SITE NOT SPECIFIED: ICD-10-CM

## 2025-02-05 DIAGNOSIS — I10 ESSENTIAL (PRIMARY) HYPERTENSION: ICD-10-CM

## 2025-02-05 DIAGNOSIS — E03.9 HYPOTHYROIDISM, UNSPECIFIED: ICD-10-CM

## 2025-02-05 DIAGNOSIS — E11.9 TYPE 2 DIABETES MELLITUS W/OUT COMPLICATIONS: ICD-10-CM

## 2025-02-05 DIAGNOSIS — R73.09 OTHER ABNORMAL GLUCOSE: ICD-10-CM

## 2025-02-05 DIAGNOSIS — R73.9 HYPERGLYCEMIA, UNSPECIFIED: ICD-10-CM

## 2025-02-05 PROCEDURE — G2211 COMPLEX E/M VISIT ADD ON: CPT

## 2025-02-05 PROCEDURE — 99205 OFFICE O/P NEW HI 60 MIN: CPT

## 2025-02-07 RX ORDER — INSULIN DEGLUDEC INJECTION 100 U/ML
100 INJECTION, SOLUTION SUBCUTANEOUS
Qty: 2 | Refills: 2 | Status: ACTIVE | COMMUNITY
Start: 2025-02-05 | End: 1900-01-01

## 2025-02-10 PROBLEM — N39.0 URINARY TRACT INFECTION WITHOUT HEMATURIA, SITE UNSPECIFIED: Status: RESOLVED | Noted: 2025-02-10 | Resolved: 2025-02-10

## 2025-02-10 PROBLEM — R73.09 ELEVATED HEMOGLOBIN A1C: Status: ACTIVE | Noted: 2025-02-10

## 2025-02-10 PROBLEM — R73.9 HYPERGLYCEMIA: Status: ACTIVE | Noted: 2025-02-10

## 2025-02-12 ENCOUNTER — APPOINTMENT (OUTPATIENT)
Dept: CARDIOLOGY | Facility: CLINIC | Age: 85
End: 2025-02-12

## 2025-02-12 DIAGNOSIS — Z87.898 PERSONAL HISTORY OF OTHER SPECIFIED CONDITIONS: ICD-10-CM

## 2025-02-12 DIAGNOSIS — R55 SYNCOPE AND COLLAPSE: ICD-10-CM

## 2025-02-12 DIAGNOSIS — R06.02 SHORTNESS OF BREATH: ICD-10-CM

## 2025-02-12 DIAGNOSIS — R00.1 BRADYCARDIA, UNSPECIFIED: ICD-10-CM

## 2025-02-19 RX ORDER — PEN NEEDLE, DIABETIC 32GX 5/32"
32G X 4 MM NEEDLE, DISPOSABLE MISCELLANEOUS
Qty: 3 | Refills: 1 | Status: ACTIVE | COMMUNITY
Start: 2025-02-18 | End: 1900-01-01

## 2025-02-20 RX ORDER — INSULIN ASPART 100 [IU]/ML
100 INJECTION, SOLUTION INTRAVENOUS; SUBCUTANEOUS
Qty: 2 | Refills: 2 | Status: ACTIVE | COMMUNITY
Start: 2025-02-19 | End: 1900-01-01

## 2025-02-26 ENCOUNTER — APPOINTMENT (OUTPATIENT)
Dept: CARDIOLOGY | Facility: CLINIC | Age: 85
End: 2025-02-26

## 2025-02-26 DIAGNOSIS — I95.1 ORTHOSTATIC HYPOTENSION: ICD-10-CM

## 2025-02-26 DIAGNOSIS — Z95.0 PRESENCE OF CARDIAC PACEMAKER: ICD-10-CM

## 2025-02-26 DIAGNOSIS — I48.0 PAROXYSMAL ATRIAL FIBRILLATION: ICD-10-CM

## 2025-02-26 DIAGNOSIS — I47.10 SUPRAVENTRICULAR TACHYCARDIA, UNSPECIFIED: ICD-10-CM

## 2025-02-26 DIAGNOSIS — I73.9 PERIPHERAL VASCULAR DISEASE, UNSPECIFIED: ICD-10-CM

## 2025-02-26 DIAGNOSIS — E78.00 PURE HYPERCHOLESTEROLEMIA, UNSPECIFIED: ICD-10-CM

## 2025-02-26 DIAGNOSIS — Z79.899 OTHER LONG TERM (CURRENT) DRUG THERAPY: ICD-10-CM

## 2025-02-26 DIAGNOSIS — R29.6 REPEATED FALLS: ICD-10-CM

## 2025-02-26 DIAGNOSIS — R07.9 CHEST PAIN, UNSPECIFIED: ICD-10-CM

## 2025-02-26 DIAGNOSIS — M79.89 OTHER SPECIFIED SOFT TISSUE DISORDERS: ICD-10-CM

## 2025-05-12 RX ORDER — ISOPROPYL ALCOHOL 0.7 ML/ML
SWAB TOPICAL
Qty: 3 | Refills: 1 | Status: ACTIVE | COMMUNITY
Start: 2025-05-12 | End: 1900-01-01

## 2025-06-10 ENCOUNTER — APPOINTMENT (OUTPATIENT)
Dept: ENDOCRINOLOGY | Facility: CLINIC | Age: 85
End: 2025-06-10